# Patient Record
Sex: FEMALE | Race: OTHER | HISPANIC OR LATINO | Employment: FULL TIME | URBAN - METROPOLITAN AREA
[De-identification: names, ages, dates, MRNs, and addresses within clinical notes are randomized per-mention and may not be internally consistent; named-entity substitution may affect disease eponyms.]

---

## 2019-10-10 ENCOUNTER — OFFICE VISIT (OUTPATIENT)
Dept: URGENT CARE | Facility: CLINIC | Age: 34
End: 2019-10-10
Payer: COMMERCIAL

## 2019-10-10 VITALS
TEMPERATURE: 99.5 F | HEART RATE: 78 BPM | WEIGHT: 242 LBS | OXYGEN SATURATION: 98 % | HEIGHT: 64 IN | SYSTOLIC BLOOD PRESSURE: 120 MMHG | DIASTOLIC BLOOD PRESSURE: 82 MMHG | BODY MASS INDEX: 41.32 KG/M2 | RESPIRATION RATE: 18 BRPM

## 2019-10-10 DIAGNOSIS — J20.9 ACUTE BRONCHITIS, UNSPECIFIED ORGANISM: Primary | ICD-10-CM

## 2019-10-10 PROCEDURE — 99203 OFFICE O/P NEW LOW 30 MIN: CPT | Performed by: PHYSICIAN ASSISTANT

## 2019-10-10 RX ORDER — AZITHROMYCIN 250 MG/1
TABLET, FILM COATED ORAL
Qty: 6 TABLET | Refills: 0 | Status: SHIPPED | OUTPATIENT
Start: 2019-10-10 | End: 2019-10-14

## 2019-10-10 RX ORDER — ALBUTEROL SULFATE 90 UG/1
2 AEROSOL, METERED RESPIRATORY (INHALATION) EVERY 4 HOURS PRN
Qty: 18 G | Refills: 0 | Status: SHIPPED | OUTPATIENT
Start: 2019-10-10

## 2019-10-10 RX ORDER — ALBUTEROL SULFATE 90 UG/1
2 AEROSOL, METERED RESPIRATORY (INHALATION) EVERY 4 HOURS PRN
COMMUNITY
End: 2019-10-10 | Stop reason: SDUPTHER

## 2019-10-10 RX ORDER — CETIRIZINE HYDROCHLORIDE 10 MG/1
10 TABLET ORAL DAILY PRN
COMMUNITY

## 2019-10-10 RX ORDER — BENZONATATE 200 MG/1
200 CAPSULE ORAL 3 TIMES DAILY PRN
Qty: 20 CAPSULE | Refills: 0 | Status: SHIPPED | OUTPATIENT
Start: 2019-10-10 | End: 2022-06-09 | Stop reason: ALTCHOICE

## 2019-10-10 NOTE — PROGRESS NOTES
[facility]        NAME: Sharif Sanchez is a 28 y o  female  : 1985    MRN: [id number]  DATE: 2019  TIME: 6:43 AM    Assessment and Plan   Acute bronchitis, unspecified organism [J20 9]  1  Acute bronchitis, unspecified organism  azithromycin (ZITHROMAX) 250 mg tablet    benzonatate (TESSALON) 200 MG capsule    albuterol (PROVENTIL HFA,VENTOLIN HFA) 90 mcg/act inhaler         Patient Instructions     Discussed condition with pt  She has acute bronchitis which I will treat with combination of Zpack, Tessalon Perles, and refilled her rescue inhaler  Rec hydration, rest, discussed OTC cold meds including Mucinex DM, continuing her Zyrtec for allergies, and observation  Follow up with PCP in 3-5 days  Proceed to  ER if symptoms worsen  Chief Complaint     Chief Complaint   Patient presents with    Cold Like Symptoms     Allergy s/s x 1 month  Then started with harsh, congested cough with chest tightness, SCHMIDT and occ  wheeze when supine 2 days ago  Has chill, sore throat, HA with facila pian, nasal congestion and PND  Taking Zyrtec and [place] Cold and Sinus (none today)   Cough         History of Present Illness       Pt presents with what started one month ago as uncontrolled allergy symptoms including runny nose, sneezing, PND, congestion but now over the past two days, she has a cough, chest hurts from the cough, pain with taking deep breath, chest tightness, wheezing in the AM when supine  She still has nasal congestion, PND, ST, chills but no fever or N/V/D  She has taken Zyrtec for allergies and [place] Cold and Flu  Has asthma as well, uses rescue inhaler PRN but does not have one currently  She vapes daily  Review of Systems   Review of Systems   Constitutional: Positive for chills  Negative for fever  HENT: Positive for congestion, postnasal drip and sore throat  Respiratory: Positive for cough, chest tightness and wheezing   Negative for shortness of breath  Cardiovascular: Negative  Gastrointestinal: Negative  Genitourinary: Negative  Current Medications       Current Outpatient Medications:     albuterol (PROVENTIL HFA,VENTOLIN HFA) 90 mcg/act inhaler, Inhale 2 puffs every 4 (four) hours as needed for wheezing, Disp: 18 g, Rfl: 0    Calcium Carbonate (CALCIUM 600) 1500 (600 Ca) MG TABS, Take by mouth daily, Disp: , Rfl:     cetirizine (ZyrTEC) 10 mg tablet, Take 10 mg by mouth daily as needed for allergies, Disp: , Rfl:     levonorgestrel (MIRENA) 20 MCG/24HR IUD, 1 each by Intrauterine route once, Disp: , Rfl:     azithromycin (ZITHROMAX) 250 mg tablet, Take 2 tablets day 1 with food, then 1 tablet daily days 2-5 with food  , Disp: 6 tablet, Rfl: 0    benzonatate (TESSALON) 200 MG capsule, Take 1 capsule (200 mg total) by mouth 3 (three) times a day as needed for cough, Disp: 20 capsule, Rfl: 0    Current Allergies     Allergies as of 10/10/2019    (No Known Allergies)            The following portions of the patient's history were reviewed and updated as appropriate: allergies, current medications, past family history, past medical history, past social history, past surgical history and problem list      Past Medical History:   Diagnosis Date    Allergic rhinitis     Asthma     Urinary tract infection        Past Surgical History:   Procedure Laterality Date    NO PAST SURGERIES         History reviewed  No pertinent family history  Medications have been verified  Objective   /82 (BP Location: Left arm, Patient Position: Sitting, Cuff Size: Large)   Pulse 78   Temp 99 5 °F (37 5 °C) (Tympanic)   Resp 18   Ht 5' 4" (1 626 m)   Wt 110 kg (242 lb)   LMP 10/09/2019 Comment: Mirena  SpO2 98%   BMI 41 54 kg/m²        Physical Exam     Physical Exam   Constitutional: She is oriented to person, place, and time  She appears well-developed and well-nourished  No distress     HENT:   Right Ear: Hearing, tympanic membrane, external ear and ear canal normal    Left Ear: Hearing, tympanic membrane, external ear and ear canal normal    Nose: Mucosal edema (B/L boggy turbinates) present  Mouth/Throat: Mucous membranes are normal  Posterior oropharyngeal erythema (PND) present  No oropharyngeal exudate  Neck: Neck supple  Cardiovascular: Normal rate, regular rhythm and normal heart sounds  Pulmonary/Chest: Effort normal  She has no wheezes  She has rhonchi (B/L diffuse coarse breath sounds heard throughout)  Lymphadenopathy:     She has no cervical adenopathy  Neurological: She is alert and oriented to person, place, and time  Psychiatric: She has a normal mood and affect  Vitals reviewed

## 2019-10-10 NOTE — LETTER
October 10, 2019     Patient: Bossman Larsen   YOB: 1985   Date of Visit: 10/10/2019       To Whom it May Concern:    Bossman Larsen was seen in my clinic on 10/10/2019  She may return to work on 10/12/2019  If you have any questions or concerns, please don't hesitate to call           Sincerely,          Alexia Hills PA-C        CC: No Recipients

## 2019-10-10 NOTE — PATIENT INSTRUCTIONS
Acute Bronchitis   WHAT YOU NEED TO KNOW:   Acute bronchitis is swelling and irritation in the air passages of your lungs  This irritation may cause you to cough or have other breathing problems  Acute bronchitis often starts because of another illness, such as a cold or the flu  The illness spreads from your nose and throat to your windpipe and airways  Bronchitis is often called a chest cold  Acute bronchitis lasts about 3 to 6 weeks and is usually not a serious illness  Your cough can last for several weeks  DISCHARGE INSTRUCTIONS:   Return to the emergency department if:   · You cough up blood  · Your lips or fingernails turn blue  · You feel like you are not getting enough air when you breathe  Contact your healthcare provider if:   · You have a fever  · Your breathing problems do not go away or get worse  · Your cough does not get better within 4 weeks  · You have questions or concerns about your condition or care  Self-care:   · Get more rest   Rest helps your body to heal  Slowly start to do more each day  Rest when you feel it is needed  · Avoid irritants in the air  Avoid chemicals, fumes, and dust  Wear a face mask if you must work around dust or fumes  Stay inside on days when air pollution levels are high  If you have allergies, stay inside when pollen counts are high  Do not use aerosol products, such as spray-on deodorant, bug spray, and hair spray  · Do not smoke or be around others who smoke  Nicotine and other chemicals in cigarettes and cigars damages the cilia that move mucus out of your lungs  Ask your healthcare provider for information if you currently smoke and need help to quit  E-cigarettes or smokeless tobacco still contain nicotine  Talk to your healthcare provider before you use these products  · Drink liquids as directed  Liquids help keep your air passages moist and help you cough up mucus   You may need to drink more liquids when you have acute bronchitis  Ask how much liquid to drink each day and which liquids are best for you  · Use a humidifier or vaporizer  Use a cool mist humidifier or a vaporizer to increase air moisture in your home  This may make it easier for you to breathe and help decrease your cough  Decrease risk for acute bronchitis:   · Get the vaccinations you need  Ask your healthcare provider if you should get vaccinated against the flu or pneumonia  · Prevent the spread of germs  You can decrease your risk of acute bronchitis and other illnesses by doing the following:     [facility] your hands often with soap and water  Carry germ-killing hand lotion or gel with you  You can use the lotion or gel to clean your hands when soap and water are not available  ¨ Do not touch your eyes, nose, or mouth unless you have washed your hands first     ¨ Always cover your mouth when you cough to prevent the spread of germs  It is best to cough into a tissue or your shirt sleeve instead of into your hand  Ask those around you cover their mouths when they cough  ¨ Try to avoid people who have a cold or the flu  If you are sick, stay away from others as much as possible  Medicines: Your healthcare provider may  give you any of the following:  · Ibuprofen or acetaminophen  are medicines that help lower your fever  They are available without a doctor's order  Ask your healthcare provider which medicine is right for you  Ask how much to take and how often to take it  Follow directions  These medicines can cause stomach bleeding if not taken correctly  Ibuprofen can cause kidney damage  Do not take ibuprofen if you have kidney disease, an ulcer, or allergies to aspirin  Acetaminophen can cause liver damage  Do not take more than 4,000 milligrams in 24 hours  · Decongestants  help loosen mucus in your lungs and make it easier to cough up  This can help you breathe easier  · Cough suppressants  decrease your urge to cough   If your cough produces mucus, do not take a cough suppressant unless your healthcare provider tells you to  Your healthcare provider may suggest that you take a cough suppressant at night so you can rest     · Inhalers  may be given  Your healthcare provider may give you one or more inhalers to help you breathe easier and cough less  An inhaler gives your medicine to open your airways  Ask your healthcare provider to show you how to use your inhaler correctly  · Take your medicine as directed  Contact your healthcare provider if you think your medicine is not helping or if you have side effects  Tell him of her if you are allergic to any medicine  Keep a list of the medicines, vitamins, and herbs you take  Include the amounts, and when and why you take them  Bring the list or the pill bottles to follow-up visits  Carry your medicine list with you in case of an emergency  Follow up with your healthcare provider as directed:  Write down questions you have so you will remember to ask them during your follow-up visits  © 2017 [address] [employer] Information is for End User's use only and may not be sold, redistributed or otherwise used for commercial purposes  All illustrations and images included in CareNotes® are the copyrighted property of [employer]  or Michael [employer]  The above information is an  only  It is not intended as medical advice for individual conditions or treatments  Talk to your doctor, nurse or pharmacist before following any medical regimen to see if it is safe and effective for you

## 2020-01-04 ENCOUNTER — OFFICE VISIT (OUTPATIENT)
Dept: URGENT CARE | Facility: CLINIC | Age: 35
End: 2020-01-04
Payer: COMMERCIAL

## 2020-01-04 ENCOUNTER — CLINICAL SUPPORT (OUTPATIENT)
Dept: URGENT CARE | Facility: CLINIC | Age: 35
End: 2020-01-04
Payer: COMMERCIAL

## 2020-01-04 VITALS
TEMPERATURE: 97.9 F | HEART RATE: 69 BPM | SYSTOLIC BLOOD PRESSURE: 167 MMHG | RESPIRATION RATE: 18 BRPM | WEIGHT: 239 LBS | HEIGHT: 64 IN | DIASTOLIC BLOOD PRESSURE: 92 MMHG | OXYGEN SATURATION: 99 % | BODY MASS INDEX: 40.8 KG/M2

## 2020-01-04 VITALS — TEMPERATURE: 97.9 F

## 2020-01-04 DIAGNOSIS — R10.32 LEFT LOWER QUADRANT ABDOMINAL PAIN: Primary | ICD-10-CM

## 2020-01-04 DIAGNOSIS — Z23 ENCOUNTER FOR IMMUNIZATION: Primary | ICD-10-CM

## 2020-01-04 LAB
SL AMB  POCT GLUCOSE, UA: NORMAL
SL AMB LEUKOCYTE ESTERASE,UA: NORMAL
SL AMB POCT BILIRUBIN,UA: NORMAL
SL AMB POCT BLOOD,UA: NORMAL
SL AMB POCT CLARITY,UA: NORMAL
SL AMB POCT COLOR,UA: YELLOW
SL AMB POCT KETONES,UA: NORMAL
SL AMB POCT NITRITE,UA: NORMAL
SL AMB POCT PH,UA: 5
SL AMB POCT SPECIFIC GRAVITY,UA: 1.01
SL AMB POCT URINE PROTEIN: NORMAL
SL AMB POCT UROBILINOGEN: 0.2

## 2020-01-04 PROCEDURE — 90471 IMMUNIZATION ADMIN: CPT

## 2020-01-04 PROCEDURE — 81002 URINALYSIS NONAUTO W/O SCOPE: CPT | Performed by: PHYSICIAN ASSISTANT

## 2020-01-04 PROCEDURE — 99213 OFFICE O/P EST LOW 20 MIN: CPT | Performed by: PHYSICIAN ASSISTANT

## 2020-01-04 PROCEDURE — 87086 URINE CULTURE/COLONY COUNT: CPT | Performed by: PHYSICIAN ASSISTANT

## 2020-01-04 PROCEDURE — 90686 IIV4 VACC NO PRSV 0.5 ML IM: CPT

## 2020-01-04 NOTE — PROGRESS NOTES
[facility]        NAME: Pooja Villar is a 27 y o  female  : 1985    MRN: [id number]  DATE: 2020  TIME: 9:57 PM    Assessment and Plan   Left lower quadrant abdominal pain [R10 32]  1  Left lower quadrant abdominal pain  POCT urine dip    Urine culture         Patient Instructions   UA dip negative, will send out urine culture and call if abnormal  Possible ovarian cyst, rec f/u with GYN  If pain worsens rec going to ER, discussed warning signs of ovarian torsion  Follow up with PCP in 3-5 days  Proceed to  ER if symptoms worsen  Chief Complaint     Chief Complaint   Patient presents with    Possible UTI     UTI s/s started approx 1 week with LLQ pain, increased frequency and pain with coitus  History of Present Illness       Marylen Brinks is a 41-year-old female complaining of left lower quadrant pain x2 days  She states the pain is described as a dull ache however occasionally has sharp twinges  She also notes an increase in pain with intercourse  She denies any dysuria, frequent urination, urinary urgency, hematuria, fever, chills, nausea, vomiting, diarrhea, or constipation  She does note a history of ovarian cyst but has not had a pelvic ultrasound done recently  She has a IUD for contraception  Review of Systems   Review of Systems   Constitutional: Negative for chills and fever  Gastrointestinal: Positive for abdominal pain  Negative for constipation, diarrhea, nausea and vomiting  Genitourinary: Negative for dysuria, flank pain, frequency, hematuria, urgency, vaginal bleeding, vaginal discharge and vaginal pain       Current Medications       Current Outpatient Medications:     albuterol (PROVENTIL HFA,VENTOLIN HFA) 90 mcg/act inhaler, Inhale 2 puffs every 4 (four) hours as needed for wheezing, Disp: 18 g, Rfl: 0    benzonatate (TESSALON) 200 MG capsule, Take 1 capsule (200 mg total) by mouth 3 (three) times a day as needed for cough, Disp: 20 capsule, Rfl: 0    Calcium Carbonate (CALCIUM 600) 1500 (600 Ca) MG TABS, Take by mouth daily, Disp: , Rfl:     cetirizine (ZyrTEC) 10 mg tablet, Take 10 mg by mouth daily as needed for allergies, Disp: , Rfl:     levonorgestrel (MIRENA) 20 MCG/24HR IUD, 1 each by Intrauterine route once, Disp: , Rfl:     Current Allergies     Allergies as of 01/04/2020    (No Known Allergies)            The following portions of the patient's history were reviewed and updated as appropriate: allergies, current medications, past family history, past medical history, past social history, past surgical history and problem list      Past Medical History:   Diagnosis Date    Allergic rhinitis     Asthma     Urinary tract infection        Past Surgical History:   Procedure Laterality Date    NO PAST SURGERIES         History reviewed  No pertinent family history  Medications have been verified  Objective   /92   Pulse 69   Temp 97 9 °F (36 6 °C)   Resp 18   Ht 5' 4" (1 626 m)   Wt 108 kg (239 lb)   SpO2 99%   BMI 41 02 kg/m²        Physical Exam     Physical Exam   Constitutional: She is oriented to person, place, and time  She appears well-developed and well-nourished  No distress  Cardiovascular: Normal rate, regular rhythm and normal heart sounds  Pulmonary/Chest: Effort normal and breath sounds normal    Abdominal: Soft  Bowel sounds are normal  There is tenderness in the left lower quadrant  There is no rigidity, no rebound, no guarding and no CVA tenderness  Neurological: She is alert and oriented to person, place, and time  Psychiatric: She has a normal mood and affect  Nursing note and vitals reviewed

## 2020-01-04 NOTE — PATIENT INSTRUCTIONS
UA dip negative, will send out urine culture and call if abnormal  Possible ovarian cyst, rec f/u with GYN  If pain worsens rec going to ER, discussed warning signs of ovarian torsion  Follow up with PCP in 3-5 days  Proceed to  ER if symptoms worsen

## 2020-01-05 LAB — BACTERIA UR CULT: NORMAL

## 2021-04-12 DIAGNOSIS — Z23 ENCOUNTER FOR IMMUNIZATION: ICD-10-CM

## 2021-04-21 ENCOUNTER — IMMUNIZATIONS (OUTPATIENT)
Dept: FAMILY MEDICINE CLINIC | Facility: HOSPITAL | Age: 36
End: 2021-04-21

## 2021-04-21 DIAGNOSIS — Z23 ENCOUNTER FOR IMMUNIZATION: Primary | ICD-10-CM

## 2021-05-12 ENCOUNTER — IMMUNIZATIONS (OUTPATIENT)
Dept: FAMILY MEDICINE CLINIC | Facility: HOSPITAL | Age: 36
End: 2021-05-12

## 2021-05-12 DIAGNOSIS — Z23 ENCOUNTER FOR IMMUNIZATION: Primary | ICD-10-CM

## 2022-06-09 ENCOUNTER — OFFICE VISIT (OUTPATIENT)
Dept: FAMILY MEDICINE CLINIC | Facility: CLINIC | Age: 37
End: 2022-06-09
Payer: COMMERCIAL

## 2022-06-09 VITALS
WEIGHT: 255 LBS | TEMPERATURE: 97.4 F | SYSTOLIC BLOOD PRESSURE: 124 MMHG | DIASTOLIC BLOOD PRESSURE: 72 MMHG | OXYGEN SATURATION: 99 % | RESPIRATION RATE: 18 BRPM | HEART RATE: 84 BPM | HEIGHT: 64 IN | BODY MASS INDEX: 43.54 KG/M2

## 2022-06-09 DIAGNOSIS — Z00.00 WELL ADULT EXAM: Primary | ICD-10-CM

## 2022-06-09 DIAGNOSIS — Z13.6 SCREENING FOR CARDIOVASCULAR, RESPIRATORY, AND GENITOURINARY DISEASES: ICD-10-CM

## 2022-06-09 DIAGNOSIS — Z13.89 SCREENING FOR CARDIOVASCULAR, RESPIRATORY, AND GENITOURINARY DISEASES: ICD-10-CM

## 2022-06-09 DIAGNOSIS — Z11.59 NEED FOR HEPATITIS C SCREENING TEST: ICD-10-CM

## 2022-06-09 DIAGNOSIS — Z11.4 SCREENING FOR HIV (HUMAN IMMUNODEFICIENCY VIRUS): ICD-10-CM

## 2022-06-09 DIAGNOSIS — Z13.29 SCREENING FOR THYROID DISORDER: ICD-10-CM

## 2022-06-09 DIAGNOSIS — Z13.83 SCREENING FOR CARDIOVASCULAR, RESPIRATORY, AND GENITOURINARY DISEASES: ICD-10-CM

## 2022-06-09 PROCEDURE — 99385 PREV VISIT NEW AGE 18-39: CPT | Performed by: FAMILY MEDICINE

## 2022-06-09 PROCEDURE — 3725F SCREEN DEPRESSION PERFORMED: CPT | Performed by: FAMILY MEDICINE

## 2022-06-09 NOTE — PROGRESS NOTES
FAMILY PRACTICE HEALTH MAINTENANCE OFFICE VISIT  [facility]    NAME: Shiloh Horner  AGE: 39 y o  SEX: female  : 1985     DATE: 2022    Assessment and Plan     1  Well adult exam    2  Need for hepatitis C screening test  -     Hepatitis C Antibody ([facility]); Future    3  Screening for HIV (human immunodeficiency virus)  -     [facility]- Human Immunodeficiency Virus 1/2 Antigen / Antibody ( Fourth Generation) with Reflex Testing; Future    4  Screening for cardiovascular, respiratory, and genitourinary diseases  -     CBC and differential; Future  -     Comprehensive metabolic panel; Future  -     Lipid Panel with Direct LDL reflex; Future    5  Screening for thyroid disorder  -     TSH, 3rd generation with Free T4 reflex; Future      Patient Counseling:   Nutrition: Stressed importance of a well balanced diet, moderation of sodium/saturated fat, caloric balance and sufficient intake of fiber  Exercise: Stressed the importance of regular exercise with a goal of 150 minutes per week  Dental Health: Discussed daily flossing and brushing and regular dental visits   Immunizations reviewed: Up To Date  Discussed benefits of:  Screening labs  BMI Counseling: Body mass index is 43 77 kg/m²  Discussed with patient's BMI with her  The BMI is above normal  Nutrition recommendations include reducing portion sizes, decreasing overall calorie intake, 3-5 servings of fruits/vegetables daily, reducing fast food intake, consuming healthier snacks, decreasing soda and/or juice intake and moderation in carbohydrate intake  Exercise recommendations include moderate aerobic physical activity for 150 minutes/week  No follow-ups on file          Chief Complaint     Chief Complaint   Patient presents with    Physical Exam     [id number]       History of Present Illness     HPI    Well Adult Physical   Patient here for a comprehensive physical exam       Diet and Physical Activity  Diet: poor diet  Exercise: frequently      Depression Screen  PHQ-2/9 Depression Screening    Little interest or pleasure in doing things: 0 - not at all  Feeling down, depressed, or hopeless: 0 - not at all  PHQ-2 Score: 0  PHQ-2 Interpretation: Negative depression screen          General Health  Hearing: Normal:  bilateral  Vision: no vision problems, goes for regular eye exams and wears glasses  Dental: regular dental visits, brushes teeth twice daily and does not floss    Reproductive Health  No issues  and Follows with gynecologist      The following portions of the patient's history were reviewed and updated as appropriate: allergies, current medications, past family history, past medical history, past social history, past surgical history and problem list     Review of Systems     Review of Systems   Constitutional: Negative  HENT: Negative  Eyes: Negative  Respiratory: Negative  Cardiovascular: Negative  Gastrointestinal: Negative  Endocrine: Negative  Genitourinary: Negative  Musculoskeletal: Negative  Skin: Negative  Allergic/Immunologic: Negative  Neurological: Negative  Hematological: Negative  Psychiatric/Behavioral: Negative          Past Medical History     Past Medical History:   Diagnosis Date    Allergic rhinitis     Asthma     Urinary tract infection        Past Surgical History     Past Surgical History:   Procedure Laterality Date    NO PAST SURGERIES         Social History     Social History     Socioeconomic History    Marital status: Single     Spouse name: None    Number of children: None    Years of education: None    Highest education level: None   Occupational History    None   Tobacco Use    Smoking status: Former Smoker     Types: Cigarettes     Quit date: 2018     Years since quittin 0    Smokeless tobacco: Never Used   Vaping Use    Vaping Use: Never used   Substance and Sexual Activity  Alcohol use: Yes     Comment: 3 wine coolers - every other week    Drug use: Yes     Types: Marijuana    Sexual activity: None   Other Topics Concern    None   Social History Narrative    None     Social Determinants of Health     Financial Resource Strain: Not on file   Food Insecurity: Not on file   Transportation Needs: Not on file   Physical Activity: Not on file   Stress: Not on file   Social Connections: Not on file   Intimate Partner Violence: Not on file   Housing Stability: Not on file       Family History     History reviewed  No pertinent family history  Current Medications       Current Outpatient Medications:     albuterol (PROVENTIL HFA,VENTOLIN HFA) 90 mcg/act inhaler, Inhale 2 puffs every 4 (four) hours as needed for wheezing, Disp: 18 g, Rfl: 0    cetirizine (ZyrTEC) 10 mg tablet, Take 10 mg by mouth daily as needed for allergies, Disp: , Rfl:     levonorgestrel (MIRENA) 20 MCG/24HR IUD, 1 each by Intrauterine route once, Disp: , Rfl:      Allergies     No Known Allergies    Objective     /72   Pulse 84   Temp (!) 97 4 °F (36 3 °C)   Resp 18   Ht 5' 4" (1 626 m)   Wt 116 kg (255 lb)   SpO2 99%   BMI 43 77 kg/m²      Physical Exam  Constitutional:       General: She is not in acute distress  Appearance: Normal appearance  She is well-developed  She is not diaphoretic  HENT:      Head: Normocephalic and atraumatic  Right Ear: Tympanic membrane, ear canal and external ear normal  There is no impacted cerumen  Left Ear: Tympanic membrane, ear canal and external ear normal  There is no impacted cerumen  Eyes:      General: No scleral icterus  Right eye: No discharge  Left eye: No discharge  Extraocular Movements: Extraocular movements intact  Conjunctiva/sclera: Conjunctivae normal       Pupils: Pupils are equal, round, and reactive to light  Cardiovascular:      Rate and Rhythm: Normal rate and regular rhythm        Heart sounds: Normal heart sounds  No murmur heard  No friction rub  No gallop  Pulmonary:      Effort: Pulmonary effort is normal  No respiratory distress  Breath sounds: Normal breath sounds  No wheezing or rales  Chest:      Chest wall: No tenderness  Abdominal:      General: Bowel sounds are normal  There is no distension  Palpations: Abdomen is soft  There is no mass  Tenderness: There is no abdominal tenderness  There is no guarding or rebound  Musculoskeletal:         General: No deformity  Normal range of motion  Cervical back: Normal range of motion and neck supple  Skin:     General: Skin is warm and dry  Findings: No erythema or rash  Neurological:      Mental Status: She is alert and oriented to person, place, and time  Psychiatric:         Behavior: Behavior normal          Thought Content:  Thought content normal          Judgment: Judgment normal             Visual Acuity Screening    Right eye Left eye Both eyes   Without correction:      With correction: 20/15 20/20 20/13           MD EVGENY Holliday [facility]

## 2022-06-15 ENCOUNTER — OFFICE VISIT (OUTPATIENT)
Dept: FAMILY MEDICINE CLINIC | Facility: CLINIC | Age: 37
End: 2022-06-15
Payer: COMMERCIAL

## 2022-06-15 VITALS
BODY MASS INDEX: 43.02 KG/M2 | HEIGHT: 64 IN | OXYGEN SATURATION: 98 % | HEART RATE: 79 BPM | TEMPERATURE: 97.9 F | DIASTOLIC BLOOD PRESSURE: 80 MMHG | SYSTOLIC BLOOD PRESSURE: 130 MMHG | WEIGHT: 252 LBS | RESPIRATION RATE: 16 BRPM

## 2022-06-15 DIAGNOSIS — Z03.818 ENCOUNTER FOR OBSERVATION FOR SUSPECTED EXPOSURE TO OTHER BIOLOGICAL AGENTS RULED OUT: ICD-10-CM

## 2022-06-15 DIAGNOSIS — Z13.21 ENCOUNTER FOR VITAMIN DEFICIENCY SCREENING: ICD-10-CM

## 2022-06-15 DIAGNOSIS — F41.9 ANXIETY: Primary | ICD-10-CM

## 2022-06-15 PROCEDURE — 3008F BODY MASS INDEX DOCD: CPT | Performed by: FAMILY MEDICINE

## 2022-06-15 PROCEDURE — 99213 OFFICE O/P EST LOW 20 MIN: CPT | Performed by: FAMILY MEDICINE

## 2022-06-15 PROCEDURE — 1036F TOBACCO NON-USER: CPT | Performed by: FAMILY MEDICINE

## 2022-06-15 NOTE — PROGRESS NOTES
Assessment/Plan:       Diagnoses and all orders for this visit:    Anxiety  -     Ambulatory Referral to [facility]; Future    Encounter for observation for suspected exposure to other biological agents ruled out  -     SARS CoV-2 ANTIBODY,IgG- Lab Collect; Future    Encounter for vitamin deficiency screening  -     Vitamin B12; Future  -     Folate; Future  -     Vitamin D 25 hydroxy; Future        Subjective:      Patient ID: Flower Khan is a 39 y o  female  HPI   Deisi Elise presents today to discuss blood work and anxiety  She would like testing for vitamin def including folic acid, X-85 and vitamin D  She also would like covid-19 antibody testing  She has been struggling with anxiety on/off for years  Would like to see a therapist      The following portions of the patient's history were reviewed and updated as appropriate: allergies, current medications, past family history, past medical history, past social history, past surgical history and problem list     Review of Systems   Constitutional: Negative  HENT: Negative  Eyes: Negative  Respiratory: Negative  Cardiovascular: Negative  Gastrointestinal: Negative  Endocrine: Negative  Genitourinary: Negative  Musculoskeletal: Negative  Skin: Negative  Allergic/Immunologic: Negative  Neurological: Negative  Hematological: Negative  Psychiatric/Behavioral: Negative  Objective:      /80   Pulse 79   Temp 97 9 °F (36 6 °C)   Resp 16   Ht 5' 4" (1 626 m)   Wt 114 kg (252 lb)   SpO2 98%   BMI 43 26 kg/m²          Physical Exam  Constitutional:       General: She is not in acute distress  Appearance: She is well-developed  She is not diaphoretic  HENT:      Head: Normocephalic and atraumatic  Eyes:      General: No scleral icterus  Right eye: No discharge  Left eye: No discharge        Conjunctiva/sclera: Conjunctivae normal    Pulmonary:      Effort: Pulmonary effort is normal    Musculoskeletal:      Cervical back: Normal range of motion  Skin:     General: Skin is warm  Neurological:      Mental Status: She is alert and oriented to person, place, and time  Psychiatric:         Behavior: Behavior normal          Thought Content:  Thought content normal          Judgment: Judgment normal

## 2022-06-17 LAB
25(OH)D3+25(OH)D2 SERPL-MCNC: 38 NG/ML (ref 30–100)
ALBUMIN SERPL-MCNC: 4.3 G/DL (ref 3.8–4.8)
ALBUMIN/GLOB SERPL: 2 {RATIO} (ref 1.2–2.2)
ALP SERPL-CCNC: 61 IU/L (ref 44–121)
ALT SERPL-CCNC: 14 IU/L (ref 0–32)
AST SERPL-CCNC: 16 IU/L (ref 0–40)
BASOPHILS # BLD AUTO: 0.1 X10E3/UL (ref 0–0.2)
BASOPHILS NFR BLD AUTO: 1 %
BILIRUB SERPL-MCNC: 0.2 MG/DL (ref 0–1.2)
BUN SERPL-MCNC: 14 MG/DL (ref 6–20)
BUN/CREAT SERPL: 19 (ref 9–23)
CALCIUM SERPL-MCNC: 9.3 MG/DL (ref 8.7–10.2)
CHLORIDE SERPL-SCNC: 102 MMOL/L (ref 96–106)
CHOLEST SERPL-MCNC: 203 MG/DL (ref 100–199)
CO2 SERPL-SCNC: 23 MMOL/L (ref 20–29)
CREAT SERPL-MCNC: 0.72 MG/DL (ref 0.57–1)
EGFR: 110 ML/MIN/1.73
EOSINOPHIL # BLD AUTO: 0.2 X10E3/UL (ref 0–0.4)
EOSINOPHIL NFR BLD AUTO: 2 %
ERYTHROCYTE [DISTWIDTH] IN BLOOD BY AUTOMATED COUNT: 13.2 % (ref 11.7–15.4)
FOLATE SERPL-MCNC: 15.6 NG/ML
GLOBULIN SER-MCNC: 2.1 G/DL (ref 1.5–4.5)
GLUCOSE SERPL-MCNC: 89 MG/DL (ref 65–99)
HCT VFR BLD AUTO: 36.1 % (ref 34–46.6)
HCV AB S/CO SERPL IA: <0.1 S/CO RATIO (ref 0–0.9)
HDLC SERPL-MCNC: 65 MG/DL
HGB BLD-MCNC: 11.9 G/DL (ref 11.1–15.9)
HIV 1+2 AB+HIV1 P24 AG SERPL QL IA: NON REACTIVE
IMM GRANULOCYTES # BLD: 0 X10E3/UL (ref 0–0.1)
IMM GRANULOCYTES NFR BLD: 0 %
LDLC SERPL CALC-MCNC: 116 MG/DL (ref 0–99)
LYMPHOCYTES # BLD AUTO: 2.3 X10E3/UL (ref 0.7–3.1)
LYMPHOCYTES NFR BLD AUTO: 27 %
MCH RBC QN AUTO: 27.9 PG (ref 26.6–33)
MCHC RBC AUTO-ENTMCNC: 33 G/DL (ref 31.5–35.7)
MCV RBC AUTO: 85 FL (ref 79–97)
MICRODELETION SYND BLD/T FISH: NORMAL
MONOCYTES # BLD AUTO: 0.6 X10E3/UL (ref 0.1–0.9)
MONOCYTES NFR BLD AUTO: 7 %
NEUTROPHILS # BLD AUTO: 5.7 X10E3/UL (ref 1.4–7)
NEUTROPHILS NFR BLD AUTO: 63 %
PLATELET # BLD AUTO: 296 X10E3/UL (ref 150–450)
POTASSIUM SERPL-SCNC: 4.3 MMOL/L (ref 3.5–5.2)
PROT SERPL-MCNC: 6.4 G/DL (ref 6–8.5)
RBC # BLD AUTO: 4.27 X10E6/UL (ref 3.77–5.28)
SARS-COV-2 AB SERPL QL IA: NORMAL U/ML
SARS-COV-2 AB SERPL-IMP: POSITIVE
SARS-COV-2 SPIKE AB DILUTION: 3568 U/ML
SODIUM SERPL-SCNC: 138 MMOL/L (ref 134–144)
TRIGL SERPL-MCNC: 124 MG/DL (ref 0–149)
TSH SERPL DL<=0.005 MIU/L-ACNC: 1.49 UIU/ML (ref 0.45–4.5)
VIT B12 SERPL-MCNC: 395 PG/ML (ref 232–1245)
WBC # BLD AUTO: 8.8 X10E3/UL (ref 3.4–10.8)

## 2023-01-11 ENCOUNTER — OFFICE VISIT (OUTPATIENT)
Dept: OBGYN CLINIC | Facility: CLINIC | Age: 38
End: 2023-01-11

## 2023-01-11 VITALS
DIASTOLIC BLOOD PRESSURE: 80 MMHG | BODY MASS INDEX: 42.1 KG/M2 | HEIGHT: 64 IN | WEIGHT: 246.6 LBS | SYSTOLIC BLOOD PRESSURE: 130 MMHG

## 2023-01-11 DIAGNOSIS — Z01.419 WELL WOMAN EXAM WITH ROUTINE GYNECOLOGICAL EXAM: Primary | ICD-10-CM

## 2023-01-11 DIAGNOSIS — Z11.51 SCREENING FOR HPV (HUMAN PAPILLOMAVIRUS): ICD-10-CM

## 2023-01-11 DIAGNOSIS — Z23 NEED FOR HPV VACCINATION: ICD-10-CM

## 2023-01-11 DIAGNOSIS — Z11.3 SCREENING FOR STD (SEXUALLY TRANSMITTED DISEASE): ICD-10-CM

## 2023-01-11 DIAGNOSIS — Z12.4 ENCOUNTER FOR SCREENING FOR MALIGNANT NEOPLASM OF CERVIX: ICD-10-CM

## 2023-01-11 RX ORDER — ACYCLOVIR 400 MG/1
TABLET ORAL
COMMUNITY
Start: 2022-10-24 | End: 2023-01-11

## 2023-01-11 RX ORDER — VALACYCLOVIR HYDROCHLORIDE 500 MG/1
500 TABLET, FILM COATED ORAL DAILY
COMMUNITY
Start: 2022-12-14

## 2023-01-11 NOTE — PROGRESS NOTES
ASSESSMENT & PLAN:   Diagnoses and all orders for this visit:    Well woman exam with routine gynecological exam  -     Liquid-based pap, screening    Encounter for screening for malignant neoplasm of cervix  -     Liquid-based pap, screening    Screening for HPV (human papillomavirus)  -     Liquid-based pap, screening    Screening for STD (sexually transmitted disease)  -     Chlamydia/GC amplified DNA by PCR    Need for HPV vaccination  -     HPV Vaccine 9 valent IM    Other orders  -     valACYclovir (VALTREX) 500 mg tablet; Take 500 mg by mouth daily  -     acyclovir (ZOVIRAX) 400 MG tablet; TAKE 1 TABLET BY MOUTH THREE TIMES A DAY FOR 5 DAYS      The following were reviewed in today's visit: ASCCP guidelines, Gardisil vaccination, STD testing breast self exam, STD testing, exercise and healthy diet  Patient to return to office in yearly for annual exam      All questions have been answered to her satisfaction  CC:  Annual Gynecologic Examination  Chief Complaint   Patient presents with   • Gynecologic Exam     Pt is here for a annual  Pap indicated, last pap 1/22/18- Result not available  HPI: Rikki Mckeon is a 44 y o  No obstetric history on file  who presents for annual gynecologic examination  She has the following concerns:  None today  Health Maintenance:    Exercise: frequently  Breast exams/breast awareness: yes  Diet: mostly well balanced      Past Medical History:   Diagnosis Date   • Allergic rhinitis    • Asthma    • HPV (human papilloma virus) infection    • Urinary tract infection        Past Surgical History:   Procedure Laterality Date   • NO PAST SURGERIES         Past OB/Gyn History:  Period Cycle (Days): 28  Period Duration (Days): 7  Period Pattern: Regular  Menstrual Flow: Heavy  Menstrual Control: Maxi pad, Thin pad, Panty linerNo LMP recorded (lmp unknown)  Patient has had an implant      Last Pap: 2018 per patient : no abnormalities  History of abnormal Pap smear: yes - PAP 6 months ago with HPV per patient  HPV vaccine completed: no, would like first vaccine today    Patient is currently sexually active  STD testing: yes  Current contraception: IUD      Family History  Family History   Problem Relation Age of Onset   • Asthma Maternal Grandmother    • Diabetes Paternal Grandmother    • Diabetes Paternal Grandfather        Family history of uterine or ovarian cancer: yes - cousin  Family history of breast cancer: no  Family history of colon cancer: no    Social History:  Social History     Socioeconomic History   • Marital status: Single     Spouse name: Not on file   • Number of children: Not on file   • Years of education: Not on file   • Highest education level: Not on file   Occupational History   • Not on file   Tobacco Use   • Smoking status: Former     Types: Cigarettes     Quit date: 2018     Years since quittin 6   • Smokeless tobacco: Never   Vaping Use   • Vaping Use: Never used   Substance and Sexual Activity   • Alcohol use: Yes     Comment: 3 wine coolers - every other week   • Drug use: Yes     Types: Marijuana     Comment: Medical Card    • Sexual activity: Yes     Partners: Male     Birth control/protection: I U D     Other Topics Concern   • Not on file   Social History Narrative   • Not on file     Social Determinants of Health     Financial Resource Strain: Not on file   Food Insecurity: Not on file   Transportation Needs: Not on file   Physical Activity: Not on file   Stress: Not on file   Social Connections: Not on file   Intimate Partner Violence: Not on file   Housing Stability: Not on file     Domestic violence screen: negative    Allergies:  No Known Allergies    Medications:    Current Outpatient Medications:   •  acyclovir (ZOVIRAX) 400 MG tablet, TAKE 1 TABLET BY MOUTH THREE TIMES A DAY FOR 5 DAYS, Disp: , Rfl:   •  albuterol (PROVENTIL HFA,VENTOLIN HFA) 90 mcg/act inhaler, Inhale 2 puffs every 4 (four) hours as needed for wheezing, Disp: 18 g, Rfl: 0  •  cetirizine (ZyrTEC) 10 mg tablet, Take 10 mg by mouth daily as needed for allergies, Disp: , Rfl:   •  levonorgestrel (MIRENA) 20 MCG/24HR IUD, 1 each by Intrauterine route once, Disp: , Rfl:   •  valACYclovir (VALTREX) 500 mg tablet, Take 500 mg by mouth daily, Disp: , Rfl:     Review of Systems:  Review of Systems   Constitutional: Negative for chills, fever and unexpected weight change  Respiratory: Negative for shortness of breath  Cardiovascular: Negative for chest pain  Gastrointestinal: Negative for abdominal pain, diarrhea, nausea and vomiting  Skin: Negative for rash  Psychiatric/Behavioral: Negative for dysphoric mood  The patient is not nervous/anxious  Physical Exam:  /80 (BP Location: Right arm, Patient Position: Sitting, Cuff Size: Standard)   Ht 5' 4" (1 626 m)   Wt 112 kg (246 lb 9 6 oz)   LMP  (LMP Unknown) Comment: IUD Mirena 2020  BMI 42 33 kg/m²    Physical Exam  Constitutional:       General: She is not in acute distress  Appearance: Normal appearance  She is obese  She is not ill-appearing, toxic-appearing or diaphoretic  Genitourinary:      Vulva and urethral meatus normal       No lesions in the vagina  Right Labia: No rash or lesions  Left Labia: No lesions or rash  No vaginal discharge, erythema or bleeding  Right Adnexa: not tender and no mass present  Left Adnexa: not tender and no mass present  No cervical discharge or lesion  IUD strings visualized  Uterus is not tender  Breasts:     Breast exam comments: Breast exam deferred  [facility] HENT:      Head: Normocephalic and atraumatic  Cardiovascular:      Rate and Rhythm: Normal rate and regular rhythm  Heart sounds: Normal heart sounds  No murmur heard  No friction rub  No gallop  Pulmonary:      Effort: Pulmonary effort is normal       Breath sounds: Normal breath sounds  No wheezing, rhonchi or rales     Abdominal: General: Abdomen is flat  There is no distension  Palpations: Abdomen is soft  Tenderness: There is no abdominal tenderness  Musculoskeletal:      Cervical back: Neck supple  Neurological:      General: No focal deficit present  Mental Status: She is alert  Skin:     General: Skin is warm and dry  Psychiatric:         Mood and Affect: Mood normal          Behavior: Behavior normal    Vitals reviewed

## 2023-01-12 LAB
HPV HR 12 DNA CVX QL NAA+PROBE: NEGATIVE
HPV16 DNA CVX QL NAA+PROBE: NEGATIVE
HPV18 DNA CVX QL NAA+PROBE: NEGATIVE

## 2023-01-13 LAB
C TRACH DNA SPEC QL NAA+PROBE: NEGATIVE
N GONORRHOEA DNA SPEC QL NAA+PROBE: NEGATIVE

## 2023-01-19 LAB
LAB AP GYN PRIMARY INTERPRETATION: NORMAL
Lab: NORMAL

## 2023-02-20 ENCOUNTER — TELEPHONE (OUTPATIENT)
Dept: PSYCHIATRY | Facility: CLINIC | Age: 38
End: 2023-02-20

## 2023-02-20 NOTE — TELEPHONE ENCOUNTER
Called patient regarding referral to be placed on proper wait list  LVM for patient to call intake dept ([phone]) back

## 2023-02-23 ENCOUNTER — TELEPHONE (OUTPATIENT)
Dept: PSYCHIATRY | Facility: CLINIC | Age: 38
End: 2023-02-23

## 2023-03-10 ENCOUNTER — CLINICAL SUPPORT (OUTPATIENT)
Dept: OBGYN CLINIC | Facility: CLINIC | Age: 38
End: 2023-03-10

## 2023-03-10 DIAGNOSIS — B00.9 HSV INFECTION: Primary | ICD-10-CM

## 2023-03-10 DIAGNOSIS — Z23 NEED FOR HPV VACCINATION: Primary | ICD-10-CM

## 2023-03-10 RX ORDER — VALACYCLOVIR HYDROCHLORIDE 500 MG/1
500 TABLET, FILM COATED ORAL DAILY
Qty: 90 TABLET | Refills: 1 | Status: SHIPPED | OUTPATIENT
Start: 2023-03-10 | End: 2023-06-08

## 2023-07-14 ENCOUNTER — CLINICAL SUPPORT (OUTPATIENT)
Dept: OBGYN CLINIC | Facility: CLINIC | Age: 38
End: 2023-07-14
Payer: COMMERCIAL

## 2023-07-14 ENCOUNTER — OFFICE VISIT (OUTPATIENT)
Dept: FAMILY MEDICINE CLINIC | Facility: CLINIC | Age: 38
End: 2023-07-14
Payer: COMMERCIAL

## 2023-07-14 VITALS
TEMPERATURE: 98.4 F | DIASTOLIC BLOOD PRESSURE: 84 MMHG | HEIGHT: 64 IN | BODY MASS INDEX: 41.48 KG/M2 | WEIGHT: 243 LBS | HEART RATE: 70 BPM | SYSTOLIC BLOOD PRESSURE: 134 MMHG | RESPIRATION RATE: 16 BRPM

## 2023-07-14 DIAGNOSIS — Z13.89 SCREENING FOR CARDIOVASCULAR, RESPIRATORY, AND GENITOURINARY DISEASES: ICD-10-CM

## 2023-07-14 DIAGNOSIS — Z13.6 SCREENING FOR CARDIOVASCULAR, RESPIRATORY, AND GENITOURINARY DISEASES: ICD-10-CM

## 2023-07-14 DIAGNOSIS — Z23 NEED FOR HPV VACCINATION: Primary | ICD-10-CM

## 2023-07-14 DIAGNOSIS — Z13.83 SCREENING FOR CARDIOVASCULAR, RESPIRATORY, AND GENITOURINARY DISEASES: ICD-10-CM

## 2023-07-14 DIAGNOSIS — Z13.29 SCREENING FOR THYROID DISORDER: ICD-10-CM

## 2023-07-14 DIAGNOSIS — Z00.00 WELL ADULT EXAM: Primary | ICD-10-CM

## 2023-07-14 DIAGNOSIS — Z23 NEED FOR VACCINATION: ICD-10-CM

## 2023-07-14 PROCEDURE — 90715 TDAP VACCINE 7 YRS/> IM: CPT

## 2023-07-14 PROCEDURE — 90471 IMMUNIZATION ADMIN: CPT

## 2023-07-14 PROCEDURE — 90651 9VHPV VACCINE 2/3 DOSE IM: CPT

## 2023-07-14 PROCEDURE — 99395 PREV VISIT EST AGE 18-39: CPT | Performed by: FAMILY MEDICINE

## 2023-07-14 NOTE — PROGRESS NOTES
FAMILY PRACTICE HEALTH MAINTENANCE OFFICE VISIT  [facility]    NAME: Ernie Page  AGE: 44 y.o. SEX: female  : 1985     DATE: 2023    Assessment and Plan     1. Well adult exam    2. Screening for thyroid disorder  -     TSH, 3rd generation with Free T4 reflex; Future    3. Screening for cardiovascular, respiratory, and genitourinary diseases  -     CBC and differential; Future  -     Comprehensive metabolic panel; Future  -     Lipid Panel with Direct LDL reflex; Future    4. Need for vaccination  -     TDAP VACCINE GREATER THAN OR EQUAL TO 6YO IM        Patient Counseling:   Nutrition: Stressed importance of a well balanced diet, moderation of sodium/saturated fat, caloric balance and sufficient intake of fiber  Exercise: Stressed the importance of regular exercise with a goal of 150 minutes per week  Dental Health: Discussed daily flossing and brushing and regular dental visits   Immunizations reviewed: See Orders  Discussed benefits of:  Screening labs. BMI Counseling: Body mass index is 41.71 kg/m². Discussed with patient's BMI with her. The BMI is above normal. Nutrition recommendations include reducing portion sizes, decreasing overall calorie intake, 3-5 servings of fruits/vegetables daily, reducing fast food intake, consuming healthier snacks and decreasing soda and/or juice intake. Exercise recommendations include moderate aerobic physical activity for 150 minutes/week. No follow-ups on file.         Chief Complaint     Chief Complaint   Patient presents with   • Physical Exam     Sas/cma       History of Present Illness     HPI    Well Adult Physical   Patient here for a comprehensive physical exam.      Diet and Physical Activity  Diet: well balanced diet  Exercise: daily      Depression Screen  PHQ-2/9 Depression Screening    Little interest or pleasure in doing things: 0 - not at all  Feeling down, depressed, or hopeless: 0 - not at all  PHQ-2 Score: 0  PHQ-2 Interpretation: Negative depression screen          General Health  Hearing: Normal:  bilateral  Vision: no vision problems, goes for regular eye exams and wears glasses  Dental: regular dental visits, brushes teeth twice daily and flosses teeth occasionally    Reproductive Health  No issues  and Follows with gynecologist      The following portions of the patient's history were reviewed and updated as appropriate: allergies, current medications, past family history, past medical history, past social history, past surgical history and problem list.    Review of Systems     Review of Systems   Constitutional: Negative. HENT: Negative. Eyes: Negative. Respiratory: Negative. Cardiovascular: Negative. Gastrointestinal: Negative. Endocrine: Negative. Genitourinary: Negative. Musculoskeletal: Negative. Skin: Negative. Allergic/Immunologic: Negative. Neurological: Negative. Hematological: Negative. Psychiatric/Behavioral: Negative.         Past Medical History     Past Medical History:   Diagnosis Date   • Allergic rhinitis    • Asthma    • HPV (human papilloma virus) infection    • Urinary tract infection        Past Surgical History     Past Surgical History:   Procedure Laterality Date   • NO PAST SURGERIES         Social History     Social History     Socioeconomic History   • Marital status: Single     Spouse name: None   • Number of children: None   • Years of education: None   • Highest education level: None   Occupational History   • None   Tobacco Use   • Smoking status: Former     Packs/day: 0.25     Years: 16.00     Total pack years: 4.00     Types: Cigarettes     Start date: 6/15/2002     Quit date: 2018     Years since quittin.1   • Smokeless tobacco: Never   Vaping Use   • Vaping Use: Never used   Substance and Sexual Activity   • Alcohol use: Yes     Comment: 3 wine coolers - every other week   • Drug use: Yes     Types: Marijuana     Comment: Medical Card 2021   • Sexual activity: Yes     Partners: Male     Birth control/protection: I.U.D. Other Topics Concern   • None   Social History Narrative   • None     Social Determinants of Health     Financial Resource Strain: Not on file   Food Insecurity: Not on file   Transportation Needs: Not on file   Physical Activity: Not on file   Stress: Not on file   Social Connections: Not on file   Intimate Partner Violence: Not on file   Housing Stability: Not on file       Family History     Family History   Problem Relation Age of Onset   • Asthma Maternal Grandmother    • Diabetes Paternal Grandmother    • Diabetes Paternal Grandfather        Current Medications       Current Outpatient Medications:   •  albuterol (PROVENTIL HFA,VENTOLIN HFA) 90 mcg/act inhaler, Inhale 2 puffs every 4 (four) hours as needed for wheezing, Disp: 18 g, Rfl: 0  •  cetirizine (ZyrTEC) 10 mg tablet, Take 10 mg by mouth daily as needed for allergies, Disp: , Rfl:   •  levonorgestrel (MIRENA) 20 MCG/24HR IUD, 1 each by Intrauterine route once, Disp: , Rfl:   •  valACYclovir (VALTREX) 500 mg tablet, Take 1 tablet (500 mg total) by mouth daily, Disp: 90 tablet, Rfl: 1     Allergies     No Known Allergies    Objective     /84   Pulse 70   Temp 98.4 °F (36.9 °C)   Resp 16   Ht 5' 4" (1.626 m)   Wt 110 kg (243 lb)   LMP 07/10/2023 (Exact Date)   BMI 41.71 kg/m²      Physical Exam  Constitutional:       General: She is not in acute distress. Appearance: Normal appearance. She is well-developed. She is not diaphoretic. HENT:      Head: Normocephalic and atraumatic. Right Ear: Tympanic membrane, ear canal and external ear normal. There is no impacted cerumen. Left Ear: Tympanic membrane, ear canal and external ear normal. There is no impacted cerumen. Eyes:      General: No scleral icterus. Right eye: No discharge. Left eye: No discharge. Extraocular Movements: Extraocular movements intact. Conjunctiva/sclera: Conjunctivae normal.      Pupils: Pupils are equal, round, and reactive to light. Cardiovascular:      Rate and Rhythm: Normal rate and regular rhythm. Heart sounds: Normal heart sounds. No murmur heard. No friction rub. No gallop. Pulmonary:      Effort: Pulmonary effort is normal. No respiratory distress. Breath sounds: Normal breath sounds. No wheezing or rales. Chest:      Chest wall: No tenderness. Abdominal:      General: Bowel sounds are normal. There is no distension. Palpations: Abdomen is soft. There is no mass. Tenderness: There is no abdominal tenderness. There is no guarding or rebound. Musculoskeletal:         General: No deformity. Normal range of motion. Cervical back: Normal range of motion and neck supple. Skin:     General: Skin is warm and dry. Findings: No erythema or rash. Neurological:      Mental Status: She is alert and oriented to person, place, and time. Psychiatric:         Behavior: Behavior normal.         Thought Content:  Thought content normal.         Judgment: Judgment normal.           Vision Screening    Right eye Left eye Both eyes   Without correction      With correction 20/20 20/25 20/20           MD EVGENY Kilpatrick [facility]

## 2023-07-17 ENCOUNTER — OFFICE VISIT (OUTPATIENT)
Dept: FAMILY MEDICINE CLINIC | Facility: CLINIC | Age: 38
End: 2023-07-17
Payer: COMMERCIAL

## 2023-07-17 VITALS
RESPIRATION RATE: 16 BRPM | HEART RATE: 90 BPM | DIASTOLIC BLOOD PRESSURE: 90 MMHG | TEMPERATURE: 98 F | SYSTOLIC BLOOD PRESSURE: 120 MMHG | WEIGHT: 243 LBS | BODY MASS INDEX: 41.71 KG/M2

## 2023-07-17 DIAGNOSIS — R10.9 ABDOMINAL CRAMPING: ICD-10-CM

## 2023-07-17 DIAGNOSIS — K92.1 BLACK STOOLS: ICD-10-CM

## 2023-07-17 DIAGNOSIS — N93.9 ABNORMAL VAGINAL BLEEDING: Primary | ICD-10-CM

## 2023-07-17 DIAGNOSIS — R30.0 DYSURIA: ICD-10-CM

## 2023-07-17 LAB
SL AMB  POCT GLUCOSE, UA: NORMAL
SL AMB LEUKOCYTE ESTERASE,UA: NORMAL
SL AMB POCT BILIRUBIN,UA: NORMAL
SL AMB POCT BLOOD,UA: NORMAL
SL AMB POCT CLARITY,UA: CLEAR
SL AMB POCT COLOR,UA: NORMAL
SL AMB POCT KETONES,UA: NORMAL
SL AMB POCT NITRITE,UA: NORMAL
SL AMB POCT PH,UA: 6.5
SL AMB POCT SPECIFIC GRAVITY,UA: 1.01
SL AMB POCT URINE PROTEIN: NORMAL
SL AMB POCT UROBILINOGEN: 0.2

## 2023-07-17 PROCEDURE — 81003 URINALYSIS AUTO W/O SCOPE: CPT | Performed by: NURSE PRACTITIONER

## 2023-07-17 PROCEDURE — 99214 OFFICE O/P EST MOD 30 MIN: CPT | Performed by: NURSE PRACTITIONER

## 2023-07-17 PROCEDURE — 87086 URINE CULTURE/COLONY COUNT: CPT | Performed by: NURSE PRACTITIONER

## 2023-07-17 NOTE — PROGRESS NOTES
Assessment/Plan:    1. Abnormal vaginal bleeding  -     US pelvis complete w transvaginal; Future; Expected date: 07/17/2023    2. Dysuria  -     POCT urine dip auto non-scope  -     Urine culture    3. Black stools  -     Ambulatory Referral to Gastroenterology; Future    4. Abdominal cramping          Patient Instructions:  Supportive care discussed and advised. Advised to RTO for any worsening and no improvement. Follow up for no improvement and worsening of conditions. Patient advised and educated when to see immediate medical care. Return if symptoms worsen or fail to improve. No future appointments. Subjective:      Patient ID: Rogers Tidwell is a 44 y.o. female. Chief Complaint   Patient presents with   • Generalized Body Aches   • Headache     Sas/cma         Vitals:  /90   Pulse 90   Temp 98 °F (36.7 °C)   Resp 16   Wt 110 kg (243 lb)   LMP 07/10/2023 (Exact Date)   BMI 41.71 kg/m²     HPI  Patient stated that has IUD and gets occasional vaginal spotting when she is stressed. Stated that this time couple of days ago started with vaginal bleeding with some abdominal cramping and mostly on lower abdomen area. Stated that also having some discomfort with urination, headache and bodyaches. Stated that one day her stool was black too. The following portions of the patient's history were reviewed and updated as appropriate: allergies, current medications, past family history, past medical history, past social history, past surgical history and problem list.      Review of Systems   Constitutional: Negative for chills, diaphoresis, fatigue, fever and unexpected weight change. Respiratory: Negative. Cardiovascular: Negative. Gastrointestinal: Positive for abdominal pain and nausea. Negative for constipation, diarrhea and vomiting. Genitourinary: Positive for dysuria and vaginal bleeding.  Negative for decreased urine volume, difficulty urinating, flank pain, frequency, genital sores, hematuria and urgency. Musculoskeletal: Positive for arthralgias. Negative for myalgias. Skin: Negative. Neurological: Positive for headaches. Negative for dizziness. Objective:    Social History     Tobacco Use   Smoking Status Former   • Packs/day: 0.25   • Years: 16.00   • Total pack years: 4.00   • Types: Cigarettes   • Start date: 6/15/2002   • Quit date: 2018   • Years since quittin.1   Smokeless Tobacco Never       Allergies: No Known Allergies      Current Outpatient Medications   Medication Sig Dispense Refill   • albuterol (PROVENTIL HFA,VENTOLIN HFA) 90 mcg/act inhaler Inhale 2 puffs every 4 (four) hours as needed for wheezing 18 g 0   • cetirizine (ZyrTEC) 10 mg tablet Take 10 mg by mouth daily as needed for allergies     • levonorgestrel (MIRENA) 20 MCG/24HR IUD 1 each by Intrauterine route once     • valACYclovir (VALTREX) 500 mg tablet Take 1 tablet (500 mg total) by mouth daily 90 tablet 1     No current facility-administered medications for this visit. Physical Exam  Vitals reviewed. Constitutional:       Appearance: She is well-developed. Cardiovascular:      Rate and Rhythm: Normal rate and regular rhythm. Heart sounds: Normal heart sounds. Pulmonary:      Effort: Pulmonary effort is normal.      Breath sounds: Normal breath sounds. Abdominal:      General: Bowel sounds are normal.      Palpations: Abdomen is soft. Tenderness: There is abdominal tenderness in the suprapubic area. Skin:     General: Skin is warm and dry. Neurological:      Mental Status: She is alert and oriented to person, place, and time. Psychiatric:         Behavior: Behavior normal.         Thought Content:  Thought content normal.         Judgment: Judgment normal.               Recent Results (from the past 24 hour(s))   POCT urine dip auto non-scope    Collection Time: 23  7:22 PM   Result Value Ref Range     COLOR,UA light yellow CLARITY,UA clear     SPECIFIC GRAVITY,UA 1.015      PH,UA 6.5     LEUKOCYTE ESTERASE,UA neg     NITRITE,UA neg     GLUCOSE, UA neg     KETONES,UA neg     BILIRUBIN,UA neg     BLOOD,UA neg     POCT URINE PROTEIN neg     SL AMB POCT UROBILINOGEN 0.2            AMISHA Laughlin

## 2023-07-19 ENCOUNTER — HOSPITAL ENCOUNTER (OUTPATIENT)
Dept: RADIOLOGY | Facility: HOSPITAL | Age: 38
Discharge: HOME | End: 2023-07-19
Payer: COMMERCIAL

## 2023-07-19 DIAGNOSIS — N93.9 ABNORMAL VAGINAL BLEEDING: ICD-10-CM

## 2023-07-19 LAB — BACTERIA UR CULT: NORMAL

## 2023-07-19 PROCEDURE — 76830 TRANSVAGINAL US NON-OB: CPT

## 2023-07-19 PROCEDURE — 76856 US EXAM PELVIC COMPLETE: CPT

## 2023-07-20 LAB
ALBUMIN SERPL-MCNC: 4 G/DL (ref 3.9–4.9)
ALBUMIN/GLOB SERPL: 1.7 {RATIO} (ref 1.2–2.2)
ALP SERPL-CCNC: 60 IU/L (ref 44–121)
ALT SERPL-CCNC: 11 IU/L (ref 0–32)
AST SERPL-CCNC: 13 IU/L (ref 0–40)
BASOPHILS # BLD AUTO: 0.1 X10E3/UL (ref 0–0.2)
BASOPHILS NFR BLD AUTO: 1 %
BILIRUB SERPL-MCNC: <0.2 MG/DL (ref 0–1.2)
BUN SERPL-MCNC: 14 MG/DL (ref 6–20)
BUN/CREAT SERPL: 17 (ref 9–23)
CALCIUM SERPL-MCNC: 8.9 MG/DL (ref 8.7–10.2)
CHLORIDE SERPL-SCNC: 104 MMOL/L (ref 96–106)
CHOLEST SERPL-MCNC: 185 MG/DL (ref 100–199)
CO2 SERPL-SCNC: 24 MMOL/L (ref 20–29)
CREAT SERPL-MCNC: 0.84 MG/DL (ref 0.57–1)
EGFR: 91 ML/MIN/1.73
EOSINOPHIL # BLD AUTO: 0.3 X10E3/UL (ref 0–0.4)
EOSINOPHIL NFR BLD AUTO: 4 %
ERYTHROCYTE [DISTWIDTH] IN BLOOD BY AUTOMATED COUNT: 13 % (ref 11.7–15.4)
GLOBULIN SER-MCNC: 2.3 G/DL (ref 1.5–4.5)
GLUCOSE SERPL-MCNC: 106 MG/DL (ref 70–99)
HCT VFR BLD AUTO: 35.8 % (ref 34–46.6)
HDLC SERPL-MCNC: 69 MG/DL
HGB BLD-MCNC: 11.9 G/DL (ref 11.1–15.9)
IMM GRANULOCYTES # BLD: 0 X10E3/UL (ref 0–0.1)
IMM GRANULOCYTES NFR BLD: 0 %
LDLC SERPL CALC-MCNC: 103 MG/DL (ref 0–99)
LYMPHOCYTES # BLD AUTO: 1.9 X10E3/UL (ref 0.7–3.1)
LYMPHOCYTES NFR BLD AUTO: 32 %
MCH RBC QN AUTO: 29.3 PG (ref 26.6–33)
MCHC RBC AUTO-ENTMCNC: 33.2 G/DL (ref 31.5–35.7)
MCV RBC AUTO: 88 FL (ref 79–97)
MICRODELETION SYND BLD/T FISH: NORMAL
MONOCYTES # BLD AUTO: 0.4 X10E3/UL (ref 0.1–0.9)
MONOCYTES NFR BLD AUTO: 7 %
NEUTROPHILS # BLD AUTO: 3.4 X10E3/UL (ref 1.4–7)
NEUTROPHILS NFR BLD AUTO: 56 %
PLATELET # BLD AUTO: 235 X10E3/UL (ref 150–450)
POTASSIUM SERPL-SCNC: 4.6 MMOL/L (ref 3.5–5.2)
PROT SERPL-MCNC: 6.3 G/DL (ref 6–8.5)
RBC # BLD AUTO: 4.06 X10E6/UL (ref 3.77–5.28)
SODIUM SERPL-SCNC: 140 MMOL/L (ref 134–144)
TRIGL SERPL-MCNC: 70 MG/DL (ref 0–149)
TSH SERPL DL<=0.005 MIU/L-ACNC: 1 UIU/ML (ref 0.45–4.5)
WBC # BLD AUTO: 6 X10E3/UL (ref 3.4–10.8)

## 2023-08-14 ENCOUNTER — TELEPHONE (OUTPATIENT)
Dept: PSYCHIATRY | Facility: CLINIC | Age: 38
End: 2023-08-14

## 2023-08-14 NOTE — TELEPHONE ENCOUNTER
LVM to R/S for NP appointment or move to her following appt 09/13/23 due to Catina not in office..

## 2023-08-29 ENCOUNTER — TELEPHONE (OUTPATIENT)
Dept: PSYCHIATRY | Facility: CLINIC | Age: 38
End: 2023-08-29

## 2023-08-29 NOTE — TELEPHONE ENCOUNTER
Patient's new appointment now moved to every other Thursday at 2:00 pm with Sarah Morgan. Patient confirmed this appointment time for 2:00 pm every other Thursday and all appointments are scheduled in computer. Time change was there is meeting for 3:00 pm Thursday.

## 2023-09-19 DIAGNOSIS — B00.9 HSV INFECTION: ICD-10-CM

## 2023-09-19 RX ORDER — VALACYCLOVIR HYDROCHLORIDE 500 MG/1
500 TABLET, FILM COATED ORAL DAILY
Qty: 90 TABLET | Refills: 1 | Status: SHIPPED | OUTPATIENT
Start: 2023-09-19 | End: 2023-12-18

## 2023-09-21 ENCOUNTER — TELEPHONE (OUTPATIENT)
Dept: PSYCHIATRY | Facility: CLINIC | Age: 38
End: 2023-09-21

## 2023-09-21 NOTE — TELEPHONE ENCOUNTER
Patient is calling regarding cancelling an appointment.     Date/Time: 9/21/2023    Reason: Patient stuck at work and can't get off    Patient was rescheduled: YES [x] NO []  If yes, when was Patient reschedule for: 9/28/2023    Patient requesting call back to reschedule: YES [] NO [x]

## 2023-09-25 ENCOUNTER — OFFICE VISIT (OUTPATIENT)
Dept: GASTROENTEROLOGY | Facility: CLINIC | Age: 38
End: 2023-09-25
Payer: COMMERCIAL

## 2023-09-25 VITALS
HEIGHT: 64 IN | HEART RATE: 84 BPM | DIASTOLIC BLOOD PRESSURE: 80 MMHG | SYSTOLIC BLOOD PRESSURE: 137 MMHG | WEIGHT: 253.2 LBS | BODY MASS INDEX: 43.23 KG/M2

## 2023-09-25 DIAGNOSIS — R10.9 ABDOMINAL CRAMPING: ICD-10-CM

## 2023-09-25 DIAGNOSIS — K92.1 BLACK STOOLS: ICD-10-CM

## 2023-09-25 DIAGNOSIS — K58.1 IRRITABLE BOWEL SYNDROME WITH CONSTIPATION: Primary | ICD-10-CM

## 2023-09-25 DIAGNOSIS — K21.9 GASTROESOPHAGEAL REFLUX DISEASE, UNSPECIFIED WHETHER ESOPHAGITIS PRESENT: ICD-10-CM

## 2023-09-25 DIAGNOSIS — R19.4 CHANGE IN BOWEL HABITS: ICD-10-CM

## 2023-09-25 DIAGNOSIS — K59.00 CONSTIPATION, UNSPECIFIED CONSTIPATION TYPE: ICD-10-CM

## 2023-09-25 PROCEDURE — 99204 OFFICE O/P NEW MOD 45 MIN: CPT | Performed by: PHYSICIAN ASSISTANT

## 2023-09-25 RX ORDER — LINACLOTIDE 72 UG/1
72 CAPSULE, GELATIN COATED ORAL DAILY
Qty: 30 CAPSULE | Refills: 3 | Status: SHIPPED | OUTPATIENT
Start: 2023-09-25

## 2023-09-25 NOTE — PROGRESS NOTES
[facility] - Outpatient Consultation  Mireya Chen 44 y.o. female MRN: [id number]  Encounter: [id number]          ASSESSMENT AND PLAN:      #1. Change in bowel habits with constipation, abdominal cramping, and bloating: over the last 2-3 months. Some improvement in bowels with metamucil but cramping remains. Did see her gynecologist as well to ensure it was not gynecological related issue.   -suspect functional type symptoms, would continue metamucil  -will trial low dose linzess for bloating/cramping/incomplete stools. If patient does not have benefit with this or has any worsening, will plan colonoscopy at next visit. Discussed this procedure in detail with patient.  -discussed medication in detail and possible SE    #2. Dark stools: happened 2-3 times a few months ago, hgb at that time was normal.  -advised to call us with any recurrence     #3. Acid reflux: has had this off and on for years but does not take meds and is controlling this well with diet at this time  -can consider EGD at same time as colonoscopy if this is pursued to rule out chronic inflammation from reflux  ______________________________________________________________________    HPI: This is a 59-year-old female with no significant past medical history who presents for new patient visit due to some change in bowel habits with abdominal cramping and bloating that started a few months ago. Patient reports prior to this she was having regular bowel movements without any issues. She reports that over the last 2 to 3 months she was having incomplete evacuation and very small stools. She had some improvement in this with taking Metamucil which she takes about 5 pills 3 times a day but it is not completely resolved. She also continues to have a lower ache with intermittent abdominal cramping in the lower abdomen that tends to be worse after eating. Denies any blood in the stool.   She reports that she had 2-3 episodes of dark stools a few months ago but this has not recurred and had a normal hemoglobin check at that time. She reports having acid reflux off and on for years but is managing this well with diet at this time with minimal symptoms. Denies any significant dysphagia symptoms. Had been on omeprazole for 30 days once about a year ago. She takes Advil 2-3 times a week. Denies any unexplained weight loss. Denies any significant family history of colon cancer, colitis, or any other significant GI issues. She works at [employer]. She has never had an EGD or colonoscopy in the past.      REVIEW OF SYSTEMS:    CONSTITUTIONAL: Denies any fever, chills, rigors, and weight loss. HEENT: No earache or tinnitus. Denies hearing loss or visual disturbances. CARDIOVASCULAR: No chest pain or palpitations. RESPIRATORY: Denies any cough, hemoptysis, shortness of breath or dyspnea on exertion. GASTROINTESTINAL: As noted in the History of Present Illness. GENITOURINARY: No problems with urination. Denies any hematuria or dysuria. NEUROLOGIC: No dizziness or vertigo, denies headaches. MUSCULOSKELETAL: Denies any muscle or joint pain. SKIN: Denies skin rashes or itching. ENDOCRINE: Denies excessive thirst. Denies intolerance to heat or cold. PSYCHOSOCIAL: Denies depression or anxiety. Denies any recent memory loss.        Historical Information   Past Medical History:   Diagnosis Date   • Allergic rhinitis    • Asthma    • HPV (human papilloma virus) infection    • Urinary tract infection      Past Surgical History:   Procedure Laterality Date   • NO PAST SURGERIES       Social History   Social History     Substance and Sexual Activity   Alcohol Use Yes    Comment: special occasions     Social History     Substance and Sexual Activity   Drug Use Yes   • Types: Marijuana    Comment: Medical Card 2021     Social History     Tobacco Use   Smoking Status Former   • Packs/day: 0.25   • Years: 16.00   • Total pack years: 4.00   • Types: Cigarettes   • Start date: 6/15/2002   • Quit date: 2018   • Years since quittin.3   Smokeless Tobacco Never     Family History   Problem Relation Age of Onset   • Asthma Maternal Grandmother    • Diabetes Paternal Grandmother    • Diabetes Paternal Grandfather        Meds/Allergies       Current Outpatient Medications:   •  albuterol (PROVENTIL HFA,VENTOLIN HFA) 90 mcg/act inhaler  •  cetirizine (ZyrTEC) 10 mg tablet  •  levonorgestrel (MIRENA) 20 MCG/24HR IUD  •  linaCLOtide (Linzess) 72 MCG CAPS  •  psyllium (METAMUCIL) 58.6 % packet  •  valACYclovir (VALTREX) 500 mg tablet    No Known Allergies        Objective     Blood pressure 137/80, pulse 84, height 5' 4" (1.626 m), weight 115 kg (253 lb 3.2 oz). Body mass index is 43.46 kg/m². PHYSICAL EXAM:      General Appearance:   Alert, cooperative, no distress   HEENT:   Normocephalic, atraumatic, anicteric.     Neck:  Supple, symmetrical, trachea midline   Lungs:   Clear to auscultation bilaterally; no rales, rhonchi or wheezing; respirations unlabored    Heart[de-identified]   Regular rate and rhythm; no murmur, rub, or gallop. Abdomen:   Soft, lower abdominal discomfort bilaterally, non-distended; normal bowel sounds; no masses, no organomegaly    Genitalia:   Deferred    Rectal:   Deferred    Extremities:  No cyanosis, clubbing or edema    Pulses:  2+ and symmetric    Skin:  No jaundice, rashes, or lesions    Lymph nodes:  No palpable cervical lymphadenopathy        Lab Results:   No visits with results within 1 Day(s) from this visit.    Latest known visit with results is:   Orders Only on 2023   Component Date Value   • White Blood Cell Count 2023 6.0    • Red Blood Cell Count 2023 4.06    • Hemoglobin 2023 11.9    • HCT 2023 35.8    • MCV 2023 88    • MCH 2023 29.3    • MCHC 2023 33.2    • RDW 2023 13.0    • Platelet Count  235    • Neutrophils 2023 56    • Lymphocytes 07/19/2023 32    • Monocytes 07/19/2023 7    • Eosinophils 07/19/2023 4    • Basophils PCT 07/19/2023 1    • Neutrophils (Absolute) 07/19/2023 3.4    • Lymphocytes (Absolute) 07/19/2023 1.9    • Monocytes (Absolute) 07/19/2023 0.4    • Eosinophils (Absolute) 07/19/2023 0.3    • Basophils ABS 07/19/2023 0.1    • Immature Granulocytes 07/19/2023 0    • Immature Granulocytes (A* 07/19/2023 0.0    • Glucose, Random 07/19/2023 106 (H)    • BUN 07/19/2023 14    • Creatinine 07/19/2023 0.84    • eGFR 07/19/2023 91    • SL AMB BUN/CREATININE RA* 07/19/2023 17    • Sodium 07/19/2023 140    • Potassium 07/19/2023 4.6    • Chloride 07/19/2023 104    • CO2 07/19/2023 24    • CALCIUM 07/19/2023 8.9    • Protein, Total 07/19/2023 6.3    • Albumin 07/19/2023 4.0    • Globulin, Total 07/19/2023 2.3    • Albumin/Globulin Ratio 07/19/2023 1.7    • TOTAL BILIRUBIN 07/19/2023 <0.2    • Alk Phos Isoenzymes 07/19/2023 60    • AST 07/19/2023 13    • ALT 07/19/2023 11    • Cholesterol, Total 07/19/2023 185    • Triglycerides 07/19/2023 70    • HDL 07/19/2023 69    • LDL Calculated 07/19/2023 103 (H)    • TSH 07/19/2023 1.000    • Interpretation 07/19/2023 Note          Radiology Results:   No results found.

## 2023-09-25 NOTE — LETTER
September 25, 2023     Santy Smith MD  [address] [address]    Patient: Sj Davidson   YOB: 1985   Date of Visit: 9/25/2023       Dear Dr. Gabbi Cisneros:    Thank you for referring Sj Davidson to me for evaluation. Below are my notes for this consultation. If you have questions, please do not hesitate to call me. I look forward to following your patient along with you. Sincerely,        Laurence Bautista PA-C        CC: AMISHA Canada  9/25/2023  3:53 PM  Sign when Signing Visit  [facility] - Outpatient Consultation  Sj Davidson 44 y.o. female MRN: [id number]  Encounter: [id number]          ASSESSMENT AND PLAN:      #1. Change in bowel habits with constipation, abdominal cramping, and bloating: over the last 2-3 months. Some improvement in bowels with metamucil but cramping remains. Did see her gynecologist as well to ensure it was not gynecological related issue.   -suspect functional type symptoms, would continue metamucil  -will trial low dose linzess for bloating/cramping/incomplete stools. If patient does not have benefit with this or has any worsening, will plan colonoscopy at next visit. Discussed this procedure in detail with patient.  -discussed medication in detail and possible SE    #2. Dark stools: happened 2-3 times a few months ago, hgb at that time was normal.  -advised to call us with any recurrence     #3.  Acid reflux: has had this off and on for years but does not take meds and is controlling this well with diet at this time  -can consider EGD at same time as colonoscopy if this is pursued to rule out chronic inflammation from reflux  ______________________________________________________________________    HPI: This is a 65-year-old female with no significant past medical history who presents for new patient visit due to some change in bowel habits with abdominal cramping and bloating that started a few months ago. Patient reports prior to this she was having regular bowel movements without any issues. She reports that over the last 2 to 3 months she was having incomplete evacuation and very small stools. She had some improvement in this with taking Metamucil which she takes about 5 pills 3 times a day but it is not completely resolved. She also continues to have a lower ache with intermittent abdominal cramping in the lower abdomen that tends to be worse after eating. Denies any blood in the stool. She reports that she had 2-3 episodes of dark stools a few months ago but this has not recurred and had a normal hemoglobin check at that time. She reports having acid reflux off and on for years but is managing this well with diet at this time with minimal symptoms. Denies any significant dysphagia symptoms. Had been on omeprazole for 30 days once about a year ago. She takes Advil 2-3 times a week. Denies any unexplained weight loss. Denies any significant family history of colon cancer, colitis, or any other significant GI issues. She works at [employer]. She has never had an EGD or colonoscopy in the past.      REVIEW OF SYSTEMS:    CONSTITUTIONAL: Denies any fever, chills, rigors, and weight loss. HEENT: No earache or tinnitus. Denies hearing loss or visual disturbances. CARDIOVASCULAR: No chest pain or palpitations. RESPIRATORY: Denies any cough, hemoptysis, shortness of breath or dyspnea on exertion. GASTROINTESTINAL: As noted in the History of Present Illness. GENITOURINARY: No problems with urination. Denies any hematuria or dysuria. NEUROLOGIC: No dizziness or vertigo, denies headaches. MUSCULOSKELETAL: Denies any muscle or joint pain. SKIN: Denies skin rashes or itching. ENDOCRINE: Denies excessive thirst. Denies intolerance to heat or cold. PSYCHOSOCIAL: Denies depression or anxiety. Denies any recent memory loss.        Historical Information   Past Medical History:   Diagnosis Date   • Allergic rhinitis    • Asthma    • HPV (human papilloma virus) infection    • Urinary tract infection      Past Surgical History:   Procedure Laterality Date   • NO PAST SURGERIES       Social History   Social History     Substance and Sexual Activity   Alcohol Use Yes    Comment: special occasions     Social History     Substance and Sexual Activity   Drug Use Yes   • Types: Marijuana    Comment: Medical Card      Social History     Tobacco Use   Smoking Status Former   • Packs/day: 0.25   • Years: 16.00   • Total pack years: 4.00   • Types: Cigarettes   • Start date: 6/15/2002   • Quit date: 2018   • Years since quittin.3   Smokeless Tobacco Never     Family History   Problem Relation Age of Onset   • Asthma Maternal Grandmother    • Diabetes Paternal Grandmother    • Diabetes Paternal Grandfather        Meds/Allergies       Current Outpatient Medications:   •  albuterol (PROVENTIL HFA,VENTOLIN HFA) 90 mcg/act inhaler  •  cetirizine (ZyrTEC) 10 mg tablet  •  levonorgestrel (MIRENA) 20 MCG/24HR IUD  •  linaCLOtide (Linzess) 72 MCG CAPS  •  psyllium (METAMUCIL) 58.6 % packet  •  valACYclovir (VALTREX) 500 mg tablet    No Known Allergies        Objective     Blood pressure 137/80, pulse 84, height 5' 4" (1.626 m), weight 115 kg (253 lb 3.2 oz). Body mass index is 43.46 kg/m². PHYSICAL EXAM:      General Appearance:   Alert, cooperative, no distress   HEENT:   Normocephalic, atraumatic, anicteric. Neck:  Supple, symmetrical, trachea midline   Lungs:   Clear to auscultation bilaterally; no rales, rhonchi or wheezing; respirations unlabored    Heart[de-identified]   Regular rate and rhythm; no murmur, rub, or gallop.    Abdomen:   Soft, lower abdominal discomfort bilaterally, non-distended; normal bowel sounds; no masses, no organomegaly    Genitalia:   Deferred    Rectal:   Deferred    Extremities:  No cyanosis, clubbing or edema    Pulses:  2+ and symmetric    Skin:  No jaundice, rashes, or lesions    Lymph nodes:  No palpable cervical lymphadenopathy        Lab Results:   No visits with results within 1 Day(s) from this visit. Latest known visit with results is:   Orders Only on 07/19/2023   Component Date Value   • White Blood Cell Count 07/19/2023 6.0    • Red Blood Cell Count 07/19/2023 4.06    • Hemoglobin 07/19/2023 11.9    • HCT 07/19/2023 35.8    • MCV 07/19/2023 88    • MCH 07/19/2023 29.3    • MCHC 07/19/2023 33.2    • RDW 07/19/2023 13.0    • Platelet Count 10/25/1902 235    • Neutrophils 07/19/2023 56    • Lymphocytes 07/19/2023 32    • Monocytes 07/19/2023 7    • Eosinophils 07/19/2023 4    • Basophils PCT 07/19/2023 1    • Neutrophils (Absolute) 07/19/2023 3.4    • Lymphocytes (Absolute) 07/19/2023 1.9    • Monocytes (Absolute) 07/19/2023 0.4    • Eosinophils (Absolute) 07/19/2023 0.3    • Basophils ABS 07/19/2023 0.1    • Immature Granulocytes 07/19/2023 0    • Immature Granulocytes (A* 07/19/2023 0.0    • Glucose, Random 07/19/2023 106 (H)    • BUN 07/19/2023 14    • Creatinine 07/19/2023 0.84    • eGFR 07/19/2023 91    • SL AMB BUN/CREATININE RA* 07/19/2023 17    • Sodium 07/19/2023 140    • Potassium 07/19/2023 4.6    • Chloride 07/19/2023 104    • CO2 07/19/2023 24    • CALCIUM 07/19/2023 8.9    • Protein, Total 07/19/2023 6.3    • Albumin 07/19/2023 4.0    • Globulin, Total 07/19/2023 2.3    • Albumin/Globulin Ratio 07/19/2023 1.7    • TOTAL BILIRUBIN 07/19/2023 <0.2    • Alk Phos Isoenzymes 07/19/2023 60    • AST 07/19/2023 13    • ALT 07/19/2023 11    • Cholesterol, Total 07/19/2023 185    • Triglycerides 07/19/2023 70    • HDL 07/19/2023 69    • LDL Calculated 07/19/2023 103 (H)    • TSH 07/19/2023 1.000    • Interpretation 07/19/2023 Note          Radiology Results:   No results found.

## 2023-11-16 ENCOUNTER — OFFICE VISIT (OUTPATIENT)
Dept: URGENT CARE | Facility: CLINIC | Age: 38
End: 2023-11-16
Payer: COMMERCIAL

## 2023-11-16 VITALS
HEIGHT: 64 IN | RESPIRATION RATE: 20 BRPM | OXYGEN SATURATION: 99 % | TEMPERATURE: 99 F | BODY MASS INDEX: 42.34 KG/M2 | WEIGHT: 248 LBS | HEART RATE: 88 BPM

## 2023-11-16 DIAGNOSIS — B34.9 VIRAL INFECTION: Primary | ICD-10-CM

## 2023-11-16 LAB
SARS-COV-2 AG UPPER RESP QL IA: NEGATIVE
VALID CONTROL: NORMAL

## 2023-11-16 PROCEDURE — 87636 SARSCOV2 & INF A&B AMP PRB: CPT | Performed by: PHYSICIAN ASSISTANT

## 2023-11-16 PROCEDURE — 99203 OFFICE O/P NEW LOW 30 MIN: CPT | Performed by: PHYSICIAN ASSISTANT

## 2023-11-16 PROCEDURE — 87811 SARS-COV-2 COVID19 W/OPTIC: CPT | Performed by: PHYSICIAN ASSISTANT

## 2023-11-16 NOTE — PROGRESS NOTES
[facility]        NAME: Juma Mccormack is a 44 y.o. female  : 1985    MRN: [id number]  DATE: 2023  TIME: 11:19 AM    Assessment and Plan   Viral infection [B34.9]  1. Viral infection  Poct Covid 19 Rapid Antigen Test    Covid19 and INFLUENZA A/B PCR        Offered strep test but pt declined. Discussed supportive care and staying hydrated. Discussed strict return to care precautions as well as red flag symptoms which should prompt immediate ED referral. Pt verbalized understanding and is in agreement with plan. Please follow up with your primary care provider within the next week. Please remember that your visit today was with an urgent care provider and should not replace follow up with your primary care provider for chronic medical issues or annual physicals. ([facility])  IF YOU TEST POSITIVE FOR COVID-19:  If you have symptoms, you can end isolation after 5 full days if you are fever-free for 24 hours without the use of fever-reducing medication and your other symptoms have improved. Loss of taste and/or smell may persist for weeks or more and should not delay the end of isolation. Your first day of symptoms is DAY ZERO. You should continue to wear a well-fitting mask (like N95, [id number]) both at home and in public for 5 additional days. Avoid people who are at high risk for severe disease for at least 10 days. DO NOT go to places where you are unable to wear a mask until a full 10 days from your first symptom. If you have no symptoms, quarantine for 5 days from the day you were tested. The day you were tested is DAY ZERO. Continue wearing a mask around others until day 10. If you develop symptoms, your 5-day isolation period starts over. If you have/had severe symptoms and/or a compromised immune system, you may need to isolate longer. Consult with your primary care provider about when you can resume being around other people.     IF YOU HAVE HAD CLOSE EXPOSURE:  QUARANTINE IF: You are ages 25 or older and either have not been vaccinated, or have been vaccinated with your primary series but not the booster. You must quarantine for at least 5 days after your last contact. If you develop symptoms, get tested immediately. If you do not develop symptoms, get tested at least 5 days after your last exposure. Avoid people who are immunocompromised at high risk for severe disease until at least after 10 days. YOU DO NOT HAVE TO QUARANTINE IF:   You are 18 years or older and have received all recommended vaccine doses, including boosters and additional primary shots for some immunocompromised people. You are ages 6-13 and completed the primary series of COVID-19 vaccines. You had confirmed COVID-19 within the last 90 days on a viral test.  You should still wear a well-fitting mask around others for 10 days from the date of your last close exposure to COVID-19, and get tested at least 5 days later. Quarantine and isolation guidelines differ for healthcare professionals. Patient Instructions       Follow up with PCP in 3-5 days. Proceed to  ER if symptoms worsen. Chief Complaint     Chief Complaint   Patient presents with    Fever     Fever 102, body aches, chills, headache began yesterday. History of Present Illness       Joe Reece is a(n) 44 y.o. female presenting with URI symptoms x 1 days  Past medical history: asthma, seasonal allergies, IBS  Congestion: yes  Sore throat: yes  Cough: yes  Sputum production: yes, occasionally; white  Fever: yes, tmax 102F last night  Body aches: yes  Loss of smell/taste: no  GI symptoms: no  Known sick contacts: yes, niece sick with similar  OTC meds tried: advil yesterday          Review of Systems   Review of Systems   Constitutional:         Negative except as noted in HPI   Respiratory:  Negative for shortness of breath. Cardiovascular:  Negative for chest pain.          Current Medications       Current Outpatient Medications:     cetirizine (ZyrTEC) 10 mg tablet, Take 10 mg by mouth daily as needed for allergies, Disp: , Rfl:     levonorgestrel (MIRENA) 20 MCG/24HR IUD, 1 each by Intrauterine route once, Disp: , Rfl:     linaCLOtide (Linzess) 72 MCG CAPS, Take 72 mcg by mouth in the morning, Disp: 30 capsule, Rfl: 3    albuterol (PROVENTIL HFA,VENTOLIN HFA) 90 mcg/act inhaler, Inhale 2 puffs every 4 (four) hours as needed for wheezing (Patient not taking: Reported on 11/16/2023), Disp: 18 g, Rfl: 0    psyllium (METAMUCIL) 58.6 % packet, Take 1 packet by mouth daily (Patient not taking: Reported on 11/16/2023), Disp: , Rfl:     valACYclovir (VALTREX) 500 mg tablet, Take 1 tablet (500 mg total) by mouth daily (Patient not taking: Reported on 11/16/2023), Disp: 90 tablet, Rfl: 1    Current Allergies     Allergies as of 11/16/2023    (No Known Allergies)            The following portions of the patient's history were reviewed and updated as appropriate: allergies, current medications, past family history, past medical history, past social history, past surgical history and problem list.     Past Medical History:   Diagnosis Date    Allergic rhinitis     Asthma     HPV (human papilloma virus) infection     Urinary tract infection        Past Surgical History:   Procedure Laterality Date    NO PAST SURGERIES         Family History   Problem Relation Age of Onset    Asthma Maternal Grandmother     Diabetes Paternal Grandmother     Diabetes Paternal Grandfather          Medications have been verified. Objective   Pulse 88   Temp 99 °F (37.2 °C)   Resp 20   Ht 5' 4" (1.626 m)   Wt 112 kg (248 lb)   SpO2 99%   BMI 42.57 kg/m²        Physical Exam     Physical Exam  Vitals and nursing note reviewed. Constitutional:       General: She is not in acute distress. Appearance: Normal appearance. She is not toxic-appearing. HENT:      Head: Normocephalic and atraumatic.       Right Ear: Tympanic membrane, ear canal and external ear normal.      Left Ear: Tympanic membrane, ear canal and external ear normal.      Nose: No congestion. Mouth/Throat:      Mouth: Mucous membranes are moist.      Pharynx: Oropharynx is clear. No oropharyngeal exudate or posterior oropharyngeal erythema. Eyes:      Conjunctiva/sclera: Conjunctivae normal.      Pupils: Pupils are equal, round, and reactive to light. Cardiovascular:      Rate and Rhythm: Normal rate and regular rhythm. Heart sounds: Normal heart sounds. Pulmonary:      Effort: Pulmonary effort is normal. No respiratory distress. Breath sounds: Normal breath sounds. No wheezing, rhonchi or rales. Musculoskeletal:      Cervical back: Normal range of motion and neck supple. Skin:     General: Skin is warm and dry. Capillary Refill: Capillary refill takes less than 2 seconds. Neurological:      Mental Status: She is alert and oriented to person, place, and time.    Psychiatric:         Behavior: Behavior normal.

## 2023-11-16 NOTE — LETTER
[facility] [address] [address] [phone]  Dept: [phone]    November 16, 2023    Patient: Tyler Jacobo  YOB: 1985    Tyler Jacobo was seen and evaluated at our [facility]. Please note if Covid and Flu tests are negative, they may return to work when fever free for 24 hours without the use of a fever reducing agent. If Covid or Flu test is positive, they may return to work on 11/20/2023, as this is 5 days from the onset of symptoms. Upon return, they must then adhere to strict masking for an additional 5 days.     Sincerely,    Chandra Zhu PA-C

## 2023-11-17 LAB
FLUAV RNA RESP QL NAA+PROBE: NEGATIVE
FLUBV RNA RESP QL NAA+PROBE: NEGATIVE
SARS-COV-2 RNA RESP QL NAA+PROBE: NEGATIVE

## 2024-01-11 DIAGNOSIS — K58.1 IRRITABLE BOWEL SYNDROME WITH CONSTIPATION: ICD-10-CM

## 2024-01-11 RX ORDER — LINACLOTIDE 72 UG/1
CAPSULE, GELATIN COATED ORAL
Qty: 90 CAPSULE | Refills: 1 | Status: SHIPPED | OUTPATIENT
Start: 2024-01-11

## 2024-03-01 ENCOUNTER — TELEPHONE (OUTPATIENT)
Age: 39
End: 2024-03-01

## 2024-03-01 DIAGNOSIS — B00.9 HSV INFECTION: ICD-10-CM

## 2024-03-01 RX ORDER — VALACYCLOVIR HYDROCHLORIDE 500 MG/1
500 TABLET, FILM COATED ORAL DAILY
Qty: 90 TABLET | Refills: 0 | Status: SHIPPED | OUTPATIENT
Start: 2024-03-01 | End: 2024-05-30

## 2024-03-01 NOTE — TELEPHONE ENCOUNTER
Sent message to the patient, requesting her to contact us to schedule a yearly appointment so that we can refill her medication.

## 2024-03-04 RX ORDER — VALACYCLOVIR HYDROCHLORIDE 500 MG/1
500 TABLET, FILM COATED ORAL DAILY
Qty: 90 TABLET | Refills: 1 | Status: SHIPPED | OUTPATIENT
Start: 2024-03-04 | End: 2024-03-04

## 2024-03-12 DIAGNOSIS — B00.9 HSV INFECTION: ICD-10-CM

## 2024-03-12 RX ORDER — VALACYCLOVIR HYDROCHLORIDE 500 MG/1
500 TABLET, FILM COATED ORAL DAILY
Qty: 90 TABLET | Refills: 0 | Status: SHIPPED | OUTPATIENT
Start: 2024-03-12 | End: 2024-06-10

## 2024-05-10 ENCOUNTER — ANNUAL EXAM (OUTPATIENT)
Dept: OBGYN CLINIC | Facility: CLINIC | Age: 39
End: 2024-05-10
Payer: COMMERCIAL

## 2024-05-10 ENCOUNTER — APPOINTMENT (OUTPATIENT)
Dept: LAB | Facility: AMBULARY SURGERY CENTER | Age: 39
End: 2024-05-10
Payer: COMMERCIAL

## 2024-05-10 VITALS
SYSTOLIC BLOOD PRESSURE: 128 MMHG | BODY MASS INDEX: 42.17 KG/M2 | HEIGHT: 64 IN | WEIGHT: 247 LBS | DIASTOLIC BLOOD PRESSURE: 82 MMHG

## 2024-05-10 DIAGNOSIS — Z11.3 SCREEN FOR STD (SEXUALLY TRANSMITTED DISEASE): ICD-10-CM

## 2024-05-10 DIAGNOSIS — Z12.31 ENCOUNTER FOR SCREENING MAMMOGRAM FOR MALIGNANT NEOPLASM OF BREAST: ICD-10-CM

## 2024-05-10 DIAGNOSIS — Z01.419 WELL WOMAN EXAM WITH ROUTINE GYNECOLOGICAL EXAM: Primary | ICD-10-CM

## 2024-05-10 LAB
HBV SURFACE AG SER QL: NORMAL
HCV AB SER QL: NORMAL
HIV 1+2 AB+HIV1 P24 AG SERPL QL IA: NORMAL
HIV 2 AB SERPL QL IA: NORMAL
HIV1 AB SERPL QL IA: NORMAL
HIV1 P24 AG SERPL QL IA: NORMAL
TREPONEMA PALLIDUM IGG+IGM AB [PRESENCE] IN SERUM OR PLASMA BY IMMUNOASSAY: NORMAL

## 2024-05-10 PROCEDURE — 99395 PREV VISIT EST AGE 18-39: CPT

## 2024-05-10 PROCEDURE — 87591 N.GONORRHOEAE DNA AMP PROB: CPT

## 2024-05-10 PROCEDURE — 86780 TREPONEMA PALLIDUM: CPT

## 2024-05-10 PROCEDURE — 87340 HEPATITIS B SURFACE AG IA: CPT

## 2024-05-10 PROCEDURE — 87491 CHLMYD TRACH DNA AMP PROBE: CPT

## 2024-05-10 PROCEDURE — 86803 HEPATITIS C AB TEST: CPT

## 2024-05-10 PROCEDURE — 36415 COLL VENOUS BLD VENIPUNCTURE: CPT

## 2024-05-10 PROCEDURE — 87389 HIV-1 AG W/HIV-1&-2 AB AG IA: CPT

## 2024-05-10 NOTE — PROGRESS NOTES
ASSESSMENT & PLAN:   Diagnoses and all orders for this visit:    Well woman exam with routine gynecological exam    Encounter for screening mammogram for malignant neoplasm of breast  -     Mammo screening bilateral w 3d & cad; Future    Screen for STD (sexually transmitted disease)  -     Chlamydia/GC amplified DNA by PCR  -     Hepatitis B surface antigen; Future  -     Hepatitis C antibody; Future  -     HIV 1/2 AG/AB w Reflex [facility] for 2 yr old and above; Future  -     RPR-Syphilis Screening (Total Syphilis IGG/IGM); Future    The following were reviewed in today's visit: ASCCP guidelines, Gardisil vaccination, STD testing breast self exam, mammography screening ordered, STD testing, exercise, and healthy diet.    Patient to return to office in yearly for annual exam.     All questions have been answered to her satisfaction.    CC:  Annual Gynecologic Examination  Chief Complaint   Patient presents with    Gynecologic Exam     Patient is here today for her yearly exam, pap up to date(23-wnl), mammo order placed. Patient is doing well, she has no concerns at this time.        HPI: Neha Powers is a 38 y.o.  who presents for annual gynecologic examination.  She has the following concerns:  none.   Mirena IUD - inserted in . Patient does not get a period with IUD.   She is sexually active with 1 partner. Would like STD screening tests today. Patient will be due for mammogram next year, after she turns 38 y/o. Order placed now.     Health Maintenance:    Exercise: daily  Breast exams/breast awareness: yes    Past Medical History:   Diagnosis Date    Allergic rhinitis     Asthma     HPV (human papilloma virus) infection     Urinary tract infection        Past Surgical History:   Procedure Laterality Date    NO PAST SURGERIES         Past OB/Gyn History:   No LMP recorded (lmp unknown). Patient has had an implant.    Last Pap: 2023 : no abnormalities  History of abnormal Pap smear: no  HPV  vaccine completed: yes    Patient is currently sexually active.   STD testing: yes  Current contraception: IUD    Family History  Family History   Problem Relation Age of Onset    Asthma Maternal Grandmother     Diabetes Paternal Grandmother     Diabetes Paternal Grandfather      Family history of uterine or ovarian cancer: yes, believes her cousin had ovarian cancer  Family history of breast cancer: no  Family history of colon cancer: no    Social History:  Social History     Socioeconomic History    Marital status: Single     Spouse name: Not on file    Number of children: Not on file    Years of education: Not on file    Highest education level: Not on file   Occupational History    Not on file   Tobacco Use    Smoking status: Former     Current packs/day: 0.00     Average packs/day: 0.3 packs/day for 16.0 years (4.0 ttl pk-yrs)     Types: Cigarettes     Start date: 6/15/2002     Quit date: 2018     Years since quittin.0    Smokeless tobacco: Never   Vaping Use    Vaping status: Never Used   Substance and Sexual Activity    Alcohol use: Yes     Comment: special occasions    Drug use: Yes     Types: Marijuana     Comment: Medical Card     Sexual activity: Yes     Partners: Male     Birth control/protection: I.U.D.   Other Topics Concern    Not on file   Social History Narrative    Not on file     Social Determinants of Health     Financial Resource Strain: Not on file   Food Insecurity: Not on file   Transportation Needs: Not on file   Physical Activity: Not on file   Stress: Not on file   Social Connections: Not on file   Intimate Partner Violence: Not on file   Housing Stability: Not on file     Domestic violence screen: negative    Allergies:  No Known Allergies    Medications:    Current Outpatient Medications:     cetirizine (ZyrTEC) 10 mg tablet, Take 10 mg by mouth daily as needed for allergies, Disp: , Rfl:     levonorgestrel (MIRENA) 20 MCG/24HR IUD, 1 each by Intrauterine route once, Disp: ,  "Rfl:     linaCLOtide (Linzess) 72 MCG CAPS, TAKE 1 CAPSULE BY MOUTH EVERY DAY IN THE MORNING, Disp: 90 capsule, Rfl: 1    valACYclovir (VALTREX) 500 mg tablet, Take 1 tablet (500 mg total) by mouth daily, Disp: 90 tablet, Rfl: 0    albuterol (PROVENTIL HFA,VENTOLIN HFA) 90 mcg/act inhaler, Inhale 2 puffs every 4 (four) hours as needed for wheezing (Patient not taking: Reported on 11/16/2023), Disp: 18 g, Rfl: 0    psyllium (METAMUCIL) 58.6 % packet, Take 1 packet by mouth daily (Patient not taking: Reported on 11/16/2023), Disp: , Rfl:     Review of Systems:  Review of Systems   Constitutional:  Negative for chills and fever.   Respiratory:  Negative for shortness of breath.    Cardiovascular:  Negative for chest pain.   Gastrointestinal:  Negative for abdominal pain, constipation and diarrhea.   Genitourinary:  Negative for dysuria, frequency, pelvic pain, vaginal discharge and vaginal pain.   Psychiatric/Behavioral:  Negative for self-injury and suicidal ideas.          Physical Exam:  /82 (BP Location: Right arm, Patient Position: Sitting, Cuff Size: Large)   Ht 5' 4\" (1.626 m)   Wt 112 kg (247 lb)   LMP  (LMP Unknown)   BMI 42.40 kg/m²    Physical Exam  Constitutional:       Appearance: Normal appearance.   Genitourinary:      Vulva and urethral meatus normal.      No labial fusion noted.      No vaginal erythema or tenderness.        Right Adnexa: not tender.     Left Adnexa: not tender.     No cervical discharge or friability.      IUD strings visualized.      Uterus is not tender.   Breasts:     Breasts are symmetrical.      Right: Normal.      Left: Normal.   HENT:      Head: Normocephalic and atraumatic.   Neck:      Thyroid: No thyroid mass or thyroid tenderness.   Cardiovascular:      Rate and Rhythm: Normal rate and regular rhythm.      Heart sounds: Normal heart sounds. No murmur heard.  Pulmonary:      Effort: Pulmonary effort is normal.      Breath sounds: Normal breath sounds. No wheezing. "   Abdominal:      Palpations: Abdomen is soft. There is no mass.      Tenderness: There is no abdominal tenderness.   Lymphadenopathy:      Cervical: No cervical adenopathy.   Neurological:      General: No focal deficit present.      Mental Status: She is alert and oriented to person, place, and time.   Skin:     General: Skin is warm and dry.   Psychiatric:         Mood and Affect: Mood normal.         Behavior: Behavior normal.   Vitals and nursing note reviewed.

## 2024-05-13 LAB
C TRACH DNA SPEC QL NAA+PROBE: NEGATIVE
N GONORRHOEA DNA SPEC QL NAA+PROBE: NEGATIVE

## 2024-06-07 DIAGNOSIS — B00.9 HSV INFECTION: ICD-10-CM

## 2024-06-07 RX ORDER — VALACYCLOVIR HYDROCHLORIDE 500 MG/1
500 TABLET, FILM COATED ORAL DAILY
Qty: 90 TABLET | Refills: 1 | Status: SHIPPED | OUTPATIENT
Start: 2024-06-07 | End: 2024-12-04

## 2024-07-18 ENCOUNTER — RA CDI HCC (OUTPATIENT)
Dept: OTHER | Facility: HOSPITAL | Age: 39
End: 2024-07-18

## 2024-07-18 DIAGNOSIS — K58.1 IRRITABLE BOWEL SYNDROME WITH CONSTIPATION: ICD-10-CM

## 2024-07-18 RX ORDER — LINACLOTIDE 72 UG/1
CAPSULE, GELATIN COATED ORAL
Qty: 100 CAPSULE | Refills: 1 | Status: SHIPPED | OUTPATIENT
Start: 2024-07-18

## 2024-07-18 NOTE — PROGRESS NOTES
HCC coding opportunities       Chart reviewed, no opportunity found: CHART REVIEWED, NO OPPORTUNITY FOUND        Patients Insurance        Commercial Insurance: [employer]

## 2024-07-25 ENCOUNTER — OFFICE VISIT (OUTPATIENT)
Dept: FAMILY MEDICINE CLINIC | Facility: CLINIC | Age: 39
End: 2024-07-25
Payer: COMMERCIAL

## 2024-07-25 VITALS
SYSTOLIC BLOOD PRESSURE: 126 MMHG | TEMPERATURE: 99 F | HEIGHT: 65 IN | DIASTOLIC BLOOD PRESSURE: 84 MMHG | WEIGHT: 248 LBS | RESPIRATION RATE: 16 BRPM | BODY MASS INDEX: 41.32 KG/M2 | HEART RATE: 78 BPM

## 2024-07-25 DIAGNOSIS — Z13.0 SCREENING FOR DEFICIENCY ANEMIA: ICD-10-CM

## 2024-07-25 DIAGNOSIS — G56.03 BILATERAL CARPAL TUNNEL SYNDROME: ICD-10-CM

## 2024-07-25 DIAGNOSIS — L98.9 SKIN LESION OF FACE: ICD-10-CM

## 2024-07-25 DIAGNOSIS — J20.9 ACUTE BRONCHITIS, UNSPECIFIED ORGANISM: ICD-10-CM

## 2024-07-25 DIAGNOSIS — Z00.00 WELL ADULT EXAM: Primary | ICD-10-CM

## 2024-07-25 DIAGNOSIS — Z13.6 SCREENING FOR CARDIOVASCULAR, RESPIRATORY, AND GENITOURINARY DISEASES: ICD-10-CM

## 2024-07-25 DIAGNOSIS — Z13.29 SCREENING FOR THYROID DISORDER: ICD-10-CM

## 2024-07-25 DIAGNOSIS — Z13.83 SCREENING FOR CARDIOVASCULAR, RESPIRATORY, AND GENITOURINARY DISEASES: ICD-10-CM

## 2024-07-25 DIAGNOSIS — Z13.89 SCREENING FOR CARDIOVASCULAR, RESPIRATORY, AND GENITOURINARY DISEASES: ICD-10-CM

## 2024-07-25 PROCEDURE — 99395 PREV VISIT EST AGE 18-39: CPT | Performed by: FAMILY MEDICINE

## 2024-07-25 RX ORDER — ALBUTEROL SULFATE 90 UG/1
2 AEROSOL, METERED RESPIRATORY (INHALATION) EVERY 4 HOURS PRN
Qty: 18 G | Refills: 0 | Status: SHIPPED | OUTPATIENT
Start: 2024-07-25

## 2024-07-25 NOTE — PROGRESS NOTES
Adult Annual Physical  Name: Neha Powers      : 1985      MRN: [id number]  Encounter Provider: Manda Barrett MD  Encounter Date: 2024   Encounter department: [facility]    Assessment & Plan   1. Well adult exam  2. Screening for deficiency anemia  -     CBC and differential; Future  -     CBC and differential  3. Screening for cardiovascular, respiratory, and genitourinary diseases  -     Comprehensive metabolic panel; Future  -     Lipid Panel with Direct LDL reflex; Future  -     Comprehensive metabolic panel  -     Lipid Panel with Direct LDL reflex  4. Screening for thyroid disorder  -     TSH, 3rd generation with Free T4 reflex; Future  -     TSH, 3rd generation with Free T4 reflex  5. Skin lesion of face  -     Ambulatory Referral to Dermatology; Future  6. Bilateral carpal tunnel syndrome  -     Ambulatory referral to Orthopedic Surgery; Future  7. Acute bronchitis, unspecified organism  -     albuterol (PROVENTIL HFA,VENTOLIN HFA) 90 mcg/act inhaler; Inhale 2 puffs every 4 (four) hours as needed for wheezing    Immunizations and preventive care screenings were discussed with patient today. Appropriate education was printed on patient's after visit summary.        Depression Screening and Follow-up Plan: Patient was screened for depression during today's encounter. They screened negative with a PHQ-2 score of 0.        History of Present Illness     Adult Annual Physical:  Patient presents for annual physical.     Diet and Physical Activity:  - Diet/Nutrition: well balanced diet.  - Exercise: walking.    Depression Screening:  - PHQ-2 Score: 0    General Health:  - Sleep: sleeps well.  - Hearing: normal hearing bilateral ears.  - Vision: goes for regular eye exams, no vision problems and wears glasses.  - Dental: regular dental visits and brushes teeth twice daily.    /GYN Health:  - Follows with GYN: yes.     Review of Systems   Constitutional: Negative.    HENT: Negative.    "  Eyes: Negative.    Respiratory: Negative.     Cardiovascular: Negative.    Gastrointestinal: Negative.    Endocrine: Negative.    Genitourinary: Negative.    Musculoskeletal: Negative.    Skin: Negative.    Allergic/Immunologic: Negative.    Neurological: Negative.    Hematological: Negative.    Psychiatric/Behavioral: Negative.           Objective     /84   Pulse 78   Temp 99 °F (37.2 °C)   Resp 16   Ht 5' 4.5\" (1.638 m)   Wt 112 kg (248 lb)   BMI 41.91 kg/m²     Physical Exam  Vitals and nursing note reviewed.   Constitutional:       General: She is not in acute distress.     Appearance: She is well-developed.   HENT:      Head: Normocephalic and atraumatic.      Right Ear: Tympanic membrane, ear canal and external ear normal. There is no impacted cerumen.      Left Ear: Tympanic membrane, ear canal and external ear normal. There is no impacted cerumen.      Nose: Nose normal.      Mouth/Throat:      Mouth: Mucous membranes are moist.   Eyes:      Conjunctiva/sclera: Conjunctivae normal.   Cardiovascular:      Rate and Rhythm: Normal rate and regular rhythm.      Heart sounds: No murmur heard.  Pulmonary:      Effort: Pulmonary effort is normal. No respiratory distress.      Breath sounds: Normal breath sounds.   Abdominal:      General: Abdomen is flat. There is no distension.      Palpations: Abdomen is soft. There is no mass.      Tenderness: There is no abdominal tenderness. There is no guarding or rebound.      Hernia: No hernia is present.   Musculoskeletal:         General: Normal range of motion.      Cervical back: Neck supple.   Skin:     General: Skin is warm and dry.   Neurological:      General: No focal deficit present.      Mental Status: She is alert and oriented to person, place, and time.         "

## 2024-07-26 LAB
ALBUMIN SERPL-MCNC: 4.3 G/DL (ref 3.9–4.9)
ALP SERPL-CCNC: 70 IU/L (ref 44–121)
ALT SERPL-CCNC: 25 IU/L (ref 0–32)
AST SERPL-CCNC: 22 IU/L (ref 0–40)
BASOPHILS # BLD AUTO: 0.1 X10E3/UL (ref 0–0.2)
BASOPHILS NFR BLD AUTO: 1 %
BILIRUB SERPL-MCNC: 0.2 MG/DL (ref 0–1.2)
BUN SERPL-MCNC: 12 MG/DL (ref 6–20)
BUN/CREAT SERPL: 14 (ref 9–23)
CALCIUM SERPL-MCNC: 9.3 MG/DL (ref 8.7–10.2)
CHLORIDE SERPL-SCNC: 101 MMOL/L (ref 96–106)
CHOLEST SERPL-MCNC: 203 MG/DL (ref 100–199)
CO2 SERPL-SCNC: 23 MMOL/L (ref 20–29)
CREAT SERPL-MCNC: 0.84 MG/DL (ref 0.57–1)
EGFR: 91 ML/MIN/1.73
EOSINOPHIL # BLD AUTO: 0.1 X10E3/UL (ref 0–0.4)
EOSINOPHIL NFR BLD AUTO: 2 %
ERYTHROCYTE [DISTWIDTH] IN BLOOD BY AUTOMATED COUNT: 13.2 % (ref 11.7–15.4)
GLOBULIN SER-MCNC: 2.4 G/DL (ref 1.5–4.5)
GLUCOSE SERPL-MCNC: 104 MG/DL (ref 70–99)
HCT VFR BLD AUTO: 38.7 % (ref 34–46.6)
HDLC SERPL-MCNC: 77 MG/DL
HGB BLD-MCNC: 12.7 G/DL (ref 11.1–15.9)
IMM GRANULOCYTES # BLD: 0 X10E3/UL (ref 0–0.1)
IMM GRANULOCYTES NFR BLD: 1 %
LDLC SERPL CALC-MCNC: 111 MG/DL (ref 0–99)
LDLC/HDLC SERPL: 1.4 RATIO (ref 0–3.2)
LYMPHOCYTES # BLD AUTO: 2.2 X10E3/UL (ref 0.7–3.1)
LYMPHOCYTES NFR BLD AUTO: 33 %
MCH RBC QN AUTO: 29.5 PG (ref 26.6–33)
MCHC RBC AUTO-ENTMCNC: 32.8 G/DL (ref 31.5–35.7)
MCV RBC AUTO: 90 FL (ref 79–97)
MICRODELETION SYND BLD/T FISH: NORMAL
MONOCYTES # BLD AUTO: 0.4 X10E3/UL (ref 0.1–0.9)
MONOCYTES NFR BLD AUTO: 7 %
NEUTROPHILS # BLD AUTO: 3.8 X10E3/UL (ref 1.4–7)
NEUTROPHILS NFR BLD AUTO: 56 %
PLATELET # BLD AUTO: 269 X10E3/UL (ref 150–450)
POTASSIUM SERPL-SCNC: 4.3 MMOL/L (ref 3.5–5.2)
PROT SERPL-MCNC: 6.7 G/DL (ref 6–8.5)
RBC # BLD AUTO: 4.3 X10E6/UL (ref 3.77–5.28)
SL AMB VLDL CHOLESTEROL CALC: 15 MG/DL (ref 5–40)
SODIUM SERPL-SCNC: 138 MMOL/L (ref 134–144)
TRIGL SERPL-MCNC: 86 MG/DL (ref 0–149)
TSH SERPL DL<=0.005 MIU/L-ACNC: 1.34 UIU/ML (ref 0.45–4.5)
WBC # BLD AUTO: 6.6 X10E3/UL (ref 3.4–10.8)

## 2024-08-09 ENCOUNTER — OFFICE VISIT (OUTPATIENT)
Dept: OBGYN CLINIC | Facility: CLINIC | Age: 39
End: 2024-08-09
Payer: COMMERCIAL

## 2024-08-09 VITALS
WEIGHT: 248 LBS | BODY MASS INDEX: 41.32 KG/M2 | DIASTOLIC BLOOD PRESSURE: 85 MMHG | SYSTOLIC BLOOD PRESSURE: 135 MMHG | HEART RATE: 64 BPM | HEIGHT: 65 IN

## 2024-08-09 DIAGNOSIS — G56.03 BILATERAL CARPAL TUNNEL SYNDROME: ICD-10-CM

## 2024-08-09 PROCEDURE — 99203 OFFICE O/P NEW LOW 30 MIN: CPT | Performed by: PHYSICIAN ASSISTANT

## 2024-08-12 NOTE — PROGRESS NOTES
ASSESSMENT/PLAN:    Assessment:   Carpal Tunnel Syndrome  bilateral    Plan:   Bracing - nighttime splinting  US to assess compression    Follow Up:  8  week(s)    To Do Next Visit:  reassess    General Discussions:  Carpal Tunnel Syndrome: The anatomy and physiology of carpal tunnel syndrome was discussed with the patient today.  Increase pressure localized under the transverse carpal ligament can cause pain, numbness, tingling, or dysesthesias within the median nerve distribution as well as feelings of fatigue, clumsiness, or awkwardness.  These symptoms typically occur at night and worse in the morning upon waking.  Eventually, untreated carpal tunnel syndrome can result in weakness and permanent loss of muscle within the thenar compartment of the hand.  Treatment options were discussed with the patient.  Conservative treatment includes nocturnal resting splints to keep the nerve in a neutral position, ergonomic changes within the work or home environment, activity modification, and tendon gliding exercises.  Steroid injections within the carpal canal can help a majority of patients, however this is often self-limited in a majority of patients.  Surgical intervention to divide the transverse carpal ligament typically results in a long-lasting relief of the patient's complaints, with the recurrence rate of less than 1%.       _____________________________________________________  CHIEF COMPLAINT:  Chief Complaint   Patient presents with    Left Wrist - Numbness    Right Wrist - Numbness         SUBJECTIVE:  Neha Powers is a 38 y.o. female who presents with bilateral hand numbness and tingling. Patient states that the pain wakes her up at night and her hands are numb. She notes that she has to shake her hands out. She denies any injuries.. she has not had any intervention.     PAST MEDICAL HISTORY:  Past Medical History:   Diagnosis Date    Allergic rhinitis     Asthma     HPV (human papilloma virus) infection   "   Urinary tract infection        PAST SURGICAL HISTORY:  Past Surgical History:   Procedure Laterality Date    NO PAST SURGERIES         FAMILY HISTORY:  Family History   Problem Relation Age of Onset    Asthma Maternal Grandmother     Diabetes Paternal Grandmother     Diabetes Paternal Grandfather        SOCIAL HISTORY:  Social History     Tobacco Use    Smoking status: Former     Current packs/day: 0.00     Average packs/day: 0.3 packs/day for 16.0 years (4.0 ttl pk-yrs)     Types: Cigarettes     Start date: 6/15/2002     Quit date: 2018     Years since quittin.2     Passive exposure: Current    Smokeless tobacco: Never   Vaping Use    Vaping status: Never Used   Substance Use Topics    Alcohol use: Yes     Comment: special occasions    Drug use: Yes     Types: Marijuana     Comment: Medical Card        MEDICATIONS:    Current Outpatient Medications:     albuterol (PROVENTIL HFA,VENTOLIN HFA) 90 mcg/act inhaler, Inhale 2 puffs every 4 (four) hours as needed for wheezing, Disp: 18 g, Rfl: 0    cetirizine (ZyrTEC) 10 mg tablet, Take 10 mg by mouth daily as needed for allergies, Disp: , Rfl:     levonorgestrel (MIRENA) 20 MCG/24HR IUD, 1 each by Intrauterine route once, Disp: , Rfl:     linaCLOtide (Linzess) 72 MCG CAPS, TAKE 1 CAPSULE BY MOUTH EVERY DAY IN THE MORNING, Disp: 100 capsule, Rfl: 1    psyllium (METAMUCIL) 58.6 % packet, Take 1 packet by mouth daily, Disp: , Rfl:     valACYclovir (VALTREX) 500 mg tablet, Take 1 tablet (500 mg total) by mouth daily, Disp: 90 tablet, Rfl: 1    ALLERGIES:  No Known Allergies    REVIEW OF SYSTEMS:  Pertinent items are noted in HPI.  A comprehensive review of systems was negative.    LABS:  HgA1c: No results found for: \"HGBA1C\"  BMP:   Lab Results   Component Value Date    K 4.3 2024    CO2 23 2024     2024    BUN 12 2024    CREATININE 0.84 2024         _____________________________________________________  PHYSICAL " "EXAMINATION:  Vital signs: /85   Pulse 64   Ht 5' 4.5\" (1.638 m)   Wt 112 kg (248 lb)   BMI 41.91 kg/m²   General: well developed and well nourished, alert, oriented times 3, and appears comfortable  Psychiatric: Normal  HEENT: Trachea Midline, No torticollis  Cardiovascular: No discernable arrhythmia  Pulmonary: No wheezing or stridor  Abdomen: No rebound or guarding  Extremities: No peripheral edema  Skin: No masses, erythema, lacerations, fluctation, ulcerations  Neurovascular: Sensation Intact to the Median, Ulnar, Radial Nerve, Motor Intact to the Median, Ulnar, Radial Nerve, and Pulses Intact    MUSCULOSKELETAL EXAMINATION:  LEFT SIDE:  Carpal tunnel:  Weakness APB and Postive Tinel's  RIGHT SIDE:  Carpal tunnel:  Weakness APB and Postive Tinel's    _____________________________________________________  STUDIES REVIEWED:  No Studies to review      PROCEDURES PERFORMED:  Procedures  No Procedures performed today    "

## 2024-08-19 ENCOUNTER — HOSPITAL ENCOUNTER (OUTPATIENT)
Dept: RADIOLOGY | Facility: HOSPITAL | Age: 39
Discharge: HOME | End: 2024-08-19
Payer: COMMERCIAL

## 2024-08-19 DIAGNOSIS — G56.03 BILATERAL CARPAL TUNNEL SYNDROME: ICD-10-CM

## 2024-08-19 PROCEDURE — 76882 US LMTD JT/FCL EVL NVASC XTR: CPT

## 2024-08-27 ENCOUNTER — TELEPHONE (OUTPATIENT)
Age: 39
End: 2024-08-27

## 2024-08-27 DIAGNOSIS — J20.9 ACUTE BRONCHITIS, UNSPECIFIED ORGANISM: ICD-10-CM

## 2024-08-27 NOTE — TELEPHONE ENCOUNTER
Patients GI provider:  Natasha    Number to return call: [phone]    Reason for call: Pt calling to see if she can get an increase in medication linzess.  Stated taking one is not helping but if she takes 2 it helps.  Schedule an appointment first available 2/4.  Please contact patient to confirm.     Scheduled procedure/appointment date if applicable: Appt 2/4

## 2024-08-28 RX ORDER — ALBUTEROL SULFATE 90 UG/1
AEROSOL, METERED RESPIRATORY (INHALATION)
Qty: 6.7 G | Refills: 1 | Status: SHIPPED | OUTPATIENT
Start: 2024-08-28

## 2024-08-29 DIAGNOSIS — K59.00 CONSTIPATION, UNSPECIFIED CONSTIPATION TYPE: Primary | ICD-10-CM

## 2024-08-29 RX ORDER — LINACLOTIDE 145 UG/1
145 CAPSULE, GELATIN COATED ORAL
Qty: 30 CAPSULE | Refills: 2 | Status: SHIPPED | OUTPATIENT
Start: 2024-08-29

## 2024-08-29 NOTE — TELEPHONE ENCOUNTER
Called and spoke to patient she stated it started about two weeks ago she was having issues and started trying taking 2 of the linzess 145 mcg and was having a big improvement and is interested in taking the Linzess 290 mcg

## 2024-10-07 ENCOUNTER — OFFICE VISIT (OUTPATIENT)
Dept: OBGYN CLINIC | Facility: CLINIC | Age: 39
End: 2024-10-07
Payer: COMMERCIAL

## 2024-10-07 VITALS — HEIGHT: 65 IN | BODY MASS INDEX: 41.32 KG/M2 | WEIGHT: 248 LBS

## 2024-10-07 DIAGNOSIS — G56.03 BILATERAL CARPAL TUNNEL SYNDROME: Primary | ICD-10-CM

## 2024-10-07 DIAGNOSIS — G56.23 CUBITAL TUNNEL SYNDROME OF BOTH UPPER EXTREMITIES: ICD-10-CM

## 2024-10-07 PROCEDURE — 99214 OFFICE O/P EST MOD 30 MIN: CPT | Performed by: STUDENT IN AN ORGANIZED HEALTH CARE EDUCATION/TRAINING PROGRAM

## 2024-10-07 NOTE — PROGRESS NOTES
ORTHOPAEDIC HAND, WRIST, AND ELBOW OFFICE  VISIT     ASSESSMENT/PLAN:    Neha Powers is a 38 y.o. RHD female who presents with bilateral carpal tunnel and cubital tunnel syndrome L>R    Patient is a referral from Jaymie Chirinos after US of bilateral wrists to further evaluate carpal tunnel. Ultrasounds reviewed with patient and is consistent with clinical exam of bilateral carpal tunnel syndrome   On clinical exam today patient is also symptomatic of bilateral cubital tunnel, but no prior imaging/studies. Bilateral elbow US ordered for further evaluation  Continue with night time splinting as tolerated  Follow up after US to discuss surgery        The patient verbalized understanding of exam findings and treatment plan. We engaged in the shared decision-making process and treatment options were discussed at length with the patient. Surgical and conservative management discussed today along with risks and benefits.    Follow Up:  After US - review results, discuss surgery          Cubital Tunnel Syndrome: The anatomy and physiology of cubital tunnel syndrome were discussed with the patient today in the office.  Typically, increased elbow flexion activities decrease blood flow within the intraneural spaces, resulting in a feeling of numbness, tingling, weakness, or clumsiness within the hand and fingers.  Occasionally, anatomic structures such as medial elbow osteophytes, the medial head of the triceps, were subluxing ulnar nerve may result in increased pressure or aggravation at the cubital tunnel.  Typical signs and symptoms usually include numbness and tingling within the ring and small finger, weakness with , and weakness with pinch.  Conservative treatment and includes nocturnal bracing to keep the elbow in a semi-extended position, activity modification, therapy, and avoiding excessive elbow flexion activities.  A majority of patients typically respond to conservative treatment over a period of  approximately 3-6 months.  EMG/NCV testing of the ulnar nerve at the elbow is not as reliable as carpal tunnel syndrome.  Surgical intervention in the form of in situ release of the ulnar nerve at the elbow or ulnar nerve transposition may be required in up to 20% of patients.       ____________________________________________________________________________________________________________________________________________      CHIEF COMPLAINT:  Bilateral hand pain and numbness    SUBJECTIVE:  Neha Powers is a 38 y.o. female who presents for initial evaluation of bilateral hand numbness and tingling. Patient previously saw Luis Miguel Chirinos PA-C who ordered US for further evaluation. Patient has been experiencing persistent pain and numbness to bilateral hands over the last 5 months. The left side has become increasingly more painful with persistent numbness with no improvement with splinting. She recently had U/S performed for her carpal tunnels, but is also complaining of numbness to the ulnar aspect of her hand on bilateral extremities. Denies any trauma. Says the numbness on the left has been affecting her ability to perform her work activities.       Previous Treatments: bracing no  relief  Associated symptoms: Numbness  Handedness: right  Work status: [employer]     I have personally reviewed all the relevant PMH, PSH, SH, FH, Medications and allergies      PAST MEDICAL HISTORY:  Past Medical History:   Diagnosis Date    Allergic rhinitis     Asthma     HPV (human papilloma virus) infection     Urinary tract infection        PAST SURGICAL HISTORY:  Past Surgical History:   Procedure Laterality Date    NO PAST SURGERIES         FAMILY HISTORY:  Family History   Problem Relation Age of Onset    Asthma Maternal Grandmother     Diabetes Paternal Grandmother     Diabetes Paternal Grandfather        SOCIAL HISTORY:  Social History     Tobacco Use    Smoking status: Former     Current packs/day: 0.00      "Average packs/day: 0.3 packs/day for 16.0 years (4.0 ttl pk-yrs)     Types: Cigarettes     Start date: 6/15/2002     Quit date: 2018     Years since quittin.4     Passive exposure: Current    Smokeless tobacco: Never   Vaping Use    Vaping status: Never Used   Substance Use Topics    Alcohol use: Yes     Comment: special occasions    Drug use: Yes     Types: Marijuana     Comment: Medical Card        MEDICATIONS:    Current Outpatient Medications:     albuterol (PROVENTIL HFA,VENTOLIN HFA) 90 mcg/act inhaler, TAKE 2 PUFFS BY MOUTH EVERY 4 HOURS AS NEEDED FOR WHEEZE, Disp: 6.7 g, Rfl: 1    cetirizine (ZyrTEC) 10 mg tablet, Take 10 mg by mouth daily as needed for allergies, Disp: , Rfl:     levonorgestrel (MIRENA) 20 MCG/24HR IUD, 1 each by Intrauterine route once, Disp: , Rfl:     linaCLOtide (Linzess) 145 MCG CAPS, Take 1 capsule (145 mcg total) by mouth daily in the early morning, Disp: 30 capsule, Rfl: 2    linaCLOtide (Linzess) 72 MCG CAPS, TAKE 1 CAPSULE BY MOUTH EVERY DAY IN THE MORNING, Disp: 100 capsule, Rfl: 1    psyllium (METAMUCIL) 58.6 % packet, Take 1 packet by mouth daily, Disp: , Rfl:     valACYclovir (VALTREX) 500 mg tablet, Take 1 tablet (500 mg total) by mouth daily, Disp: 90 tablet, Rfl: 1    ALLERGIES:  No Known Allergies        REVIEW OF SYSTEMS:  Pertinent items are noted in HPI.  A comprehensive review of systems was negative.    VITALS:  Vitals:       LABS:  HgA1c: No results found for: \"HGBA1C\"  BMP:   Lab Results   Component Value Date    K 4.3 2024    CO2 23 2024     2024    BUN 12 2024    CREATININE 0.84 2024       _____________________________________________________  PHYSICAL EXAMINATION:  General: well developed and well nourished, alert, oriented times 3, and appears comfortable  Psychiatric: Normal  HEENT: Normocephalic, Atraumatic Trachea Midline, No torticollis  Pulmonary: No audible wheezing or respiratory distress   Abdomen/GI: Non " "tender, non distended   Cardiovascular: No pitting edema, 2+ radial pulse   Skin: No masses, erythema, lacerations, fluctation, ulcerations  Neurovascular: Sensation Intact to the Median, Ulnar, Radial Nerve, Motor Intact to the Median, Ulnar, Radial Nerve, and Pulses Intact  Musculoskeletal: Normal, except as noted in detailed exam and in HPI.        FOCUSED MUSCULOSKELETAL EXAMINATION:  Right Upper Extremity  Inspection: skin intact, no notable deformity   Palpation:  NTTP  Neurologic:  5/5 elbow flexion, 5/5 elbow extension, 5/5 wrist extension, 5/5 wrist flexion, 5/5 finger flexion, 5/5 finger extension, 5/5 FPL, 5/5 EPL, 5/5 APB, 5/5 intrinsics, sensation intact to median, radial, and ulnar nerve distributions  Vascular: Palpable radial pulse, brisk cap refill <2sec, hand warm and well perfused  MSK:   RIGHT SIDE:  Carpal tunnel:  Weakness APB, Postive Tinel's, and Positive Derkin's Compression Test  Positive elbow flexion compression test    Left Upper Extremity  Inspection: skin intact, no notable deformity   Palpation:  NTTP  Neurologic:  5/5 elbow flexion, 5/5 elbow extension, 5/5 wrist extension, 5/5 wrist flexion, 5/5 finger flexion, 5/5 finger extension, 5/5 FPL, 5/5 EPL, 5/5 APB, 5/5 intrinsics, sensation intact to median, radial, and ulnar nerve distributions  Vascular: Palpable radial pulse, brisk cap refill <2sec, hand warm and well perfused  MSK:   LEFT SIDE:  Carpal tunnel:  Weakness APB, Postive Tinel's, and Positive Derkin's Compression Test  Positive elbow flexion compression test  ___________________________________________________  STUDIES REVIEWED:  I have personally reviewed and interpreted  US of bilateral wrist obtained on 8/19/24 which demonstrates findings suspicious for carpal tunnel based on presence of hypervascularity within the median nerve      LABS REVIEWED:    HgA1c: No results found for: \"HGBA1C\"  BMP:   Lab Results   Component Value Date    K 4.3 07/25/2024    CO2 23 07/25/2024 "     07/25/2024    BUN 12 07/25/2024    CREATININE 0.84 07/25/2024               PROCEDURES PERFORMED:    I agree with the history, physical examination, assessment and plan of care as documented above.    Getachew Sandoval M.D.  Attending, Orthopaedic Surgery  Hand, Wrist, and Elbow Surgery  [facility]

## 2024-10-29 ENCOUNTER — HOSPITAL ENCOUNTER (OUTPATIENT)
Dept: RADIOLOGY | Facility: HOSPITAL | Age: 39
Discharge: HOME | End: 2024-10-29
Attending: STUDENT IN AN ORGANIZED HEALTH CARE EDUCATION/TRAINING PROGRAM
Payer: COMMERCIAL

## 2024-10-29 DIAGNOSIS — G56.23 CUBITAL TUNNEL SYNDROME OF BOTH UPPER EXTREMITIES: ICD-10-CM

## 2024-10-29 DIAGNOSIS — J20.9 ACUTE BRONCHITIS, UNSPECIFIED ORGANISM: ICD-10-CM

## 2024-10-29 PROCEDURE — 76882 US LMTD JT/FCL EVL NVASC XTR: CPT

## 2024-10-29 RX ORDER — ALBUTEROL SULFATE 90 UG/1
INHALANT RESPIRATORY (INHALATION)
Qty: 18 G | Refills: 1 | Status: SHIPPED | OUTPATIENT
Start: 2024-10-29

## 2024-11-18 ENCOUNTER — OFFICE VISIT (OUTPATIENT)
Dept: OBGYN CLINIC | Facility: CLINIC | Age: 39
End: 2024-11-18
Payer: COMMERCIAL

## 2024-11-18 VITALS
BODY MASS INDEX: 41.32 KG/M2 | SYSTOLIC BLOOD PRESSURE: 117 MMHG | HEART RATE: 73 BPM | HEIGHT: 65 IN | WEIGHT: 248 LBS | DIASTOLIC BLOOD PRESSURE: 81 MMHG

## 2024-11-18 DIAGNOSIS — G56.23 CUBITAL TUNNEL SYNDROME OF BOTH UPPER EXTREMITIES: ICD-10-CM

## 2024-11-18 DIAGNOSIS — G56.03 BILATERAL CARPAL TUNNEL SYNDROME: Primary | ICD-10-CM

## 2024-11-18 PROCEDURE — 99214 OFFICE O/P EST MOD 30 MIN: CPT | Performed by: STUDENT IN AN ORGANIZED HEALTH CARE EDUCATION/TRAINING PROGRAM

## 2024-11-18 PROCEDURE — NC001 PR NO CHARGE: Performed by: STUDENT IN AN ORGANIZED HEALTH CARE EDUCATION/TRAINING PROGRAM

## 2024-11-18 RX ORDER — CEFAZOLIN SODIUM 2 G/50ML
2000 SOLUTION INTRAVENOUS ONCE
OUTPATIENT
Start: 2024-11-18 | End: 2024-11-18

## 2024-11-18 RX ORDER — CHLORHEXIDINE GLUCONATE ORAL RINSE 1.2 MG/ML
15 SOLUTION DENTAL ONCE
OUTPATIENT
Start: 2024-11-18 | End: 2024-11-18

## 2024-11-18 NOTE — PROGRESS NOTES
ORTHOPAEDIC HAND, WRIST, AND ELBOW OFFICE  VISIT     ASSESSMENT/PLAN:    Neha Powers is a 38 y.o. RHD female who presents with bilateral carpal tunnel and cubital tunnel syndrome L>R    We discussed that treatment options for carpal and cubital tunnel syndrome include nighttime splinting, injection, therapy and surgery.   The patient has exhausted conservative mgmt for carpal tunnel with recent U/S inconclusive for concomitant cubital syndrome. Clinically patient is significantly symptomatic and reproducible symptoms on clinical exam therefore if going to proceed I do recommend concomitant release of cubital tunnel on more symptomatic side at time of surgery.  The patient was interested in proceeding with left carpal and cubital tunnel surgery.  We reviewed risks of surgery, which include but are not limited to bleeding, infection, wound healing complications, damage to surrounding structures including possible nerve, vessel or tendon damage, need for additional procedures, recurrence, lack of improvement, stiffness, and scarring. No guarantees were made.      She expressed understanding and consented to proceed. We reviewed the expected postoperative course and that we will plan to remove her sutures in 10-14 days postoperatively and she will likely be able to return to work at 2-3 weeks postoperatively. She is happy with the plan.       The patient verbalized understanding of exam findings and treatment plan. We engaged in the shared decision-making process and treatment options were discussed at length with the patient. Surgical and conservative management discussed today along with risks and benefits.    Follow Up:  Post op         Cubital Tunnel Syndrome: The anatomy and physiology of cubital tunnel syndrome were discussed with the patient today in the office.  Typically, increased elbow flexion activities decrease blood flow within the intraneural spaces, resulting in a feeling of numbness, tingling,  weakness, or clumsiness within the hand and fingers.  Occasionally, anatomic structures such as medial elbow osteophytes, the medial head of the triceps, were subluxing ulnar nerve may result in increased pressure or aggravation at the cubital tunnel.  Typical signs and symptoms usually include numbness and tingling within the ring and small finger, weakness with , and weakness with pinch.  Conservative treatment and includes nocturnal bracing to keep the elbow in a semi-extended position, activity modification, therapy, and avoiding excessive elbow flexion activities.  A majority of patients typically respond to conservative treatment over a period of approximately 3-6 months.  EMG/NCV testing of the ulnar nerve at the elbow is not as reliable as carpal tunnel syndrome.  Surgical intervention in the form of in situ release of the ulnar nerve at the elbow or ulnar nerve transposition may be required in up to 20% of patients.       ____________________________________________________________________________________________________________________________________________      CHIEF COMPLAINT:  Bilateral hand pain and numbness    SUBJECTIVE:  Neha Powers is a 38 y.o. female who presents for initial evaluation of bilateral hand numbness and tingling. Patient previously saw Luis Miguel Chirinos PA-C who ordered US for further evaluation. Patient has been experiencing persistent pain and numbness to bilateral hands over the last 5 months. The left side has become increasingly more painful with persistent numbness with no improvement with splinting. She recently had U/S performed for her carpal tunnels, but is also complaining of numbness to the ulnar aspect of her hand on bilateral extremities. She was sent for repeat U/S of elbow and presents today to review results. Denies any trauma. Says the numbness on the left has been affecting her ability to perform her work activities.       Previous Treatments: bracing no   relief  Associated symptoms: Numbness  Handedness: right  Work status: [employer]     I have personally reviewed all the relevant PMH, PSH, SH, FH, Medications and allergies      PAST MEDICAL HISTORY:  Past Medical History:   Diagnosis Date    Allergic rhinitis     Asthma     HPV (human papilloma virus) infection     Urinary tract infection        PAST SURGICAL HISTORY:  Past Surgical History:   Procedure Laterality Date    NO PAST SURGERIES         FAMILY HISTORY:  Family History   Problem Relation Age of Onset    Asthma Maternal Grandmother     Diabetes Paternal Grandmother     Diabetes Paternal Grandfather        SOCIAL HISTORY:  Social History     Tobacco Use    Smoking status: Former     Current packs/day: 0.00     Average packs/day: 0.3 packs/day for 16.0 years (4.0 ttl pk-yrs)     Types: Cigarettes     Start date: 6/15/2002     Quit date: 2018     Years since quittin.5     Passive exposure: Current    Smokeless tobacco: Never   Vaping Use    Vaping status: Never Used   Substance Use Topics    Alcohol use: Yes     Comment: special occasions    Drug use: Yes     Types: Marijuana     Comment: Medical Card        MEDICATIONS:    Current Outpatient Medications:     albuterol (PROVENTIL HFA,VENTOLIN HFA) 90 mcg/act inhaler, INHALE 2 PUFFS BY MOUTH EVERY 4 HOURS AS NEEDED FOR WHEEZE, Disp: 18 g, Rfl: 1    cetirizine (ZyrTEC) 10 mg tablet, Take 10 mg by mouth daily as needed for allergies, Disp: , Rfl:     levonorgestrel (MIRENA) 20 MCG/24HR IUD, 1 each by Intrauterine route once, Disp: , Rfl:     linaCLOtide (Linzess) 145 MCG CAPS, Take 1 capsule (145 mcg total) by mouth daily in the early morning, Disp: 30 capsule, Rfl: 2    linaCLOtide (Linzess) 72 MCG CAPS, TAKE 1 CAPSULE BY MOUTH EVERY DAY IN THE MORNING, Disp: 100 capsule, Rfl: 1    psyllium (METAMUCIL) 58.6 % packet, Take 1 packet by mouth daily, Disp: , Rfl:     valACYclovir (VALTREX) 500 mg tablet, Take 1 tablet (500 mg total) by mouth  "daily, Disp: 90 tablet, Rfl: 1    ALLERGIES:  No Known Allergies        REVIEW OF SYSTEMS:  Pertinent items are noted in HPI.  A comprehensive review of systems was negative.    VITALS:  There were no vitals filed for this visit.      LABS:  HgA1c: No results found for: \"HGBA1C\"  BMP:   Lab Results   Component Value Date    K 4.3 07/25/2024    CO2 23 07/25/2024     07/25/2024    BUN 12 07/25/2024    CREATININE 0.84 07/25/2024       _____________________________________________________  PHYSICAL EXAMINATION:  General: well developed and well nourished, alert, oriented times 3, and appears comfortable  Psychiatric: Normal  HEENT: Normocephalic, Atraumatic Trachea Midline, No torticollis  Pulmonary: No audible wheezing or respiratory distress   Abdomen/GI: Non tender, non distended   Cardiovascular: No pitting edema, 2+ radial pulse   Skin: No masses, erythema, lacerations, fluctation, ulcerations  Neurovascular: Sensation Intact to the Median, Ulnar, Radial Nerve, Motor Intact to the Median, Ulnar, Radial Nerve, and Pulses Intact  Musculoskeletal: Normal, except as noted in detailed exam and in HPI.        FOCUSED MUSCULOSKELETAL EXAMINATION:  Right Upper Extremity  Inspection: skin intact, no notable deformity   Palpation:  NTTP  Neurologic:  5/5 elbow flexion, 5/5 elbow extension, 5/5 wrist extension, 5/5 wrist flexion, 5/5 finger flexion, 5/5 finger extension, 5/5 FPL, 5/5 EPL, 5/5 APB, 5/5 intrinsics, sensation intact to median, radial, and ulnar nerve distributions  Vascular: Palpable radial pulse, brisk cap refill <2sec, hand warm and well perfused  MSK:   RIGHT SIDE:  Carpal tunnel:  Weakness APB, Postive Tinel's, and Positive Derkin's Compression Test  Positive elbow flexion compression test    Left Upper Extremity  Inspection: skin intact, no notable deformity   Palpation:  NTTP  Neurologic:  5/5 elbow flexion, 5/5 elbow extension, 5/5 wrist extension, 5/5 wrist flexion, 5/5 finger flexion, 5/5 finger " "extension, 5/5 FPL, 5/5 EPL, 5/5 APB, 5/5 intrinsics, sensation intact to median, radial, and ulnar nerve distributions  Vascular: Palpable radial pulse, brisk cap refill <2sec, hand warm and well perfused  MSK:   LEFT SIDE:  Carpal tunnel:  Weakness APB, Postive Tinel's, and Positive Derkin's Compression Test  Positive elbow flexion compression test  ___________________________________________________  STUDIES REVIEWED:  I have personally reviewed and interpreted  US of bilateral wrist obtained on 8/19/24 which demonstrates findings suspicious for carpal tunnel based on presence of hypervascularity within the median nerve      U/S of bilateral elbow were independently reviewed which demonstrates both right and left ulnar nerve edema with increased cross sectional area indicative of possible cubital tunnel syndrome and ulnar neuropathy.       LABS REVIEWED:    HgA1c: No results found for: \"HGBA1C\"  BMP:   Lab Results   Component Value Date    K 4.3 07/25/2024    CO2 23 07/25/2024     07/25/2024    BUN 12 07/25/2024    CREATININE 0.84 07/25/2024               PROCEDURES PERFORMED:    I agree with the history, physical examination, assessment and plan of care as documented above.    Getachew Sandoval M.D.  Attending, Orthopaedic Surgery  Hand, Wrist, and Elbow Surgery  [facility]     "

## 2024-11-25 ENCOUNTER — ANESTHESIA EVENT (OUTPATIENT)
Dept: PERIOP | Facility: HOSPITAL | Age: 39
End: 2024-11-25
Payer: COMMERCIAL

## 2024-11-25 DIAGNOSIS — E66.01 MORBID OBESITY (HCC): Primary | ICD-10-CM

## 2024-11-25 RX ORDER — DIPHENOXYLATE HYDROCHLORIDE AND ATROPINE SULFATE 2.5; .025 MG/1; MG/1
1 TABLET ORAL DAILY
COMMUNITY

## 2024-11-25 NOTE — PRE-PROCEDURE INSTRUCTIONS
Pre-Surgery Instructions:   Medication Instructions    albuterol (PROVENTIL HFA,VENTOLIN HFA) 90 mcg/act inhaler Uses PRN- OK to take day of surgery    cetirizine (ZyrTEC) 10 mg tablet Take day of surgery.    levonorgestrel (MIRENA) 20 MCG/24HR IUD Maintain    linaCLOtide (Linzess) 145 MCG CAPS Hold day of surgery.    multivitamin (THERAGRAN) TABS Stop taking 7 days prior to surgery.    valACYclovir (VALTREX) 500 mg tablet Take day of surgery.    Medication instructions for day surgery reviewed. Please use only a sip of water to take your instructed medications. Avoid all over the counter vitamins, supplements and NSAIDS for one week prior to surgery per anesthesia guidelines. Tylenol is ok to take as needed.     You will receive a call one business day prior to surgery with an arrival time and hospital directions. If your surgery is scheduled on a Monday, the hospital will be calling you on the Friday prior to your surgery. If you have not heard from anyone by 8pm, please call the hospital supervisor through the hospital  at [phone]. ([place] [phone] or [place] [phone]).    Do not eat or drink anything after midnight the night before your surgery, including candy, mints, lifesavers, or chewing gum. Do not drink alcohol 24hrs before your surgery. Try not to smoke at least 24hrs before your surgery.       Follow the pre surgery showering instructions as listed in the “My Surgical Experience Booklet” or otherwise provided by your surgeon's office. Do not use a blade to shave the surgical area 1 week before surgery. It is okay to use a clean electric clippers up to 24 hours before surgery. Do not apply any lotions, creams, including makeup, cologne, deodorant, or perfumes after showering on the day of your surgery. Do not use dry shampoo, hair spray, hair gel, or any type of hair products.     No contact lenses, eye make-up, or artificial eyelashes. Remove nail polish, including gel polish, and  any artificial, gel, or acrylic nails if possible. Remove all jewelry including rings and body piercing jewelry.     Wear causal clothing that is easy to take on and off. Consider your type of surgery.    Keep any valuables, jewelry, piercings at home. Please bring any specially ordered equipment (sling, braces) if indicated.    Arrange for a responsible person to drive you to and from the hospital on the day of your surgery. Please confirm the visitor policy for the day of your procedure when you receive your phone call with an arrival time.     Call the surgeon's office with any new illnesses, exposures, or additional questions prior to surgery.    Please reference your “My Surgical Experience Booklet” for additional information to prepare for your upcoming surgery.

## 2024-11-28 DIAGNOSIS — B00.9 HSV INFECTION: ICD-10-CM

## 2024-11-29 RX ORDER — VALACYCLOVIR HYDROCHLORIDE 500 MG/1
500 TABLET, FILM COATED ORAL DAILY
Qty: 90 TABLET | Refills: 1 | Status: SHIPPED | OUTPATIENT
Start: 2024-11-29 | End: 2025-05-28

## 2024-11-30 DIAGNOSIS — K59.00 CONSTIPATION, UNSPECIFIED CONSTIPATION TYPE: ICD-10-CM

## 2024-12-02 RX ORDER — LINACLOTIDE 145 UG/1
145 CAPSULE, GELATIN COATED ORAL
Qty: 30 CAPSULE | Refills: 2 | Status: SHIPPED | OUTPATIENT
Start: 2024-12-02

## 2024-12-04 DIAGNOSIS — G56.03 BILATERAL CARPAL TUNNEL SYNDROME: ICD-10-CM

## 2024-12-04 DIAGNOSIS — G56.23 CUBITAL TUNNEL SYNDROME OF BOTH UPPER EXTREMITIES: Primary | ICD-10-CM

## 2024-12-04 RX ORDER — ONDANSETRON 4 MG/1
4 TABLET, FILM COATED ORAL EVERY 8 HOURS PRN
Qty: 4 TABLET | Refills: 0 | Status: SHIPPED | OUTPATIENT
Start: 2024-12-04

## 2024-12-04 RX ORDER — SENNOSIDES 8.6 MG
650 CAPSULE ORAL EVERY 8 HOURS
Qty: 30 TABLET | Refills: 0 | Status: SHIPPED | OUTPATIENT
Start: 2024-12-04 | End: 2024-12-14

## 2024-12-04 RX ORDER — NAPROXEN 500 MG/1
500 TABLET ORAL 2 TIMES DAILY WITH MEALS
Qty: 20 TABLET | Refills: 0 | Status: SHIPPED | OUTPATIENT
Start: 2024-12-04 | End: 2024-12-14

## 2024-12-04 NOTE — DISCHARGE INSTR - AVS FIRST PAGE
Getachew Sandoval MD  Post-operative Instructions  -Carpal and Cubital Tunnel Decompression -    Thank you for allowing me to participate in your care. It is a privilege to be able to take care of you. Should you have any questions about your post-operative care feel free to call my office at #[phone]  during business hours and either myself or a member of my team will address any questions or concerns you may have. If this is an urgent matter at night or on weekends please call [phone]  and ask the page  to page the covering 1st call orthopaedic physician.    Prescription refills or changes cannot be addressed after normal business hours or on weekends.    General Post op Instructions  Please allow your body time to heal. No sports, heavy lifting, or vigorous physical activity until you return for follow-up.   Return to your normal diet as soon as you are able.  It is normal to have some swelling and bruising of the hand and fingers post operatively. This can be uncomfortable. Prevent excessive swelling by performing the following:  Elevate your hand above the level of your heart as much as possible during the first 24-48 hours post op.    Move your fingers as much as possible, curling them gently into making a fist and extending them all the way out. Do this as often as you are able (this will not be possible while your nerve block is in effect).   Only wear your sling when you are out of the house or for comfort. You may remove it as soon as you are comfortable.  You may resume all of your home medications. If you are currently undergoing chemotherapy or treatment for a rheumatologic condition please be sure to discuss the continuation of these medications with me prior to leaving the hospital.   Driving:  You may not return to driving while you are taking narcotic pain medication. We strongly advise against return to driving if your procedure was performed on your Right side and your car has a  manual transmission (“stick shift”).   Incision Care and Bathing  You may remove your compressive wrap/ACE bandage after 24 hours. If it feels excessively tight or your fingers are swollen despite elevation and movement you may loosen the compressive wrap sooner.   Please keep your waterproof dressing about the elbow in place until your follow-up appointment.    You may remove the bandage about your wrist 3 days after surgery and cover the incision with a Band-Aid until your follow-up appointment.   No swimming or submerging your wound in water until sutures have been removed and the incision is completely healed.   You may shower 24 hours after surgery. If you choose to shower please tape a waterproof plastic bag around your arm.  After 3 days you may shower normally. The dressing about the elbow is waterproof, the bandage about the wrist, is not.  After the sutures have been removed, gently massage the scar with a moisturizing cream such as Aquaphor two times a day to promote healing and prevent excessive scar formation.   Pain Control  Following the program below will greatly decrease your post-operative pain.   Medications have been sent to your pharmacy.  Aleve (naproxen) 500 mg and Tylenol Arthritis 650 mg on the afternoon/evening of surgery. Do NOT take Aleve if you have a history of gastric ulcers, uncontrolled reflux or have been told previously by a physician that you should not take anti-inflammatory medications such as Advil/Aleve/Motrin.    Aleve (naproxen) 500 mg in the morning and afternoon, for about 2-3 days after the surgery; even if you have no pain.  You can stop two days after surgery if your hand does not hurt.     Tylenol Arthritis (or any brand of acetaminophen 8-hour), 650 mg every eight hours, with a maximum dose of 3000 mg per day, for about 2-3 days after the surgery, even if you have no pain. Tylenol Arthritis plus Aleve work together as a team to make each other stronger.     The  maximum amount of Tylenol is 3000 mg per day, which is the same as 4 of the 650 mg pills. Remember; don't substitute any other medication for the Tylenol: don't take Motrin, aspirin, or any other over the counter medication. It must be Tylenol (or any brand of acetaminophen) for it to work as a team. Remember as well that Tylenol Arthritis is taken every 8 hours, the Aleve is twice a day.   (ondansetron or meclizine) has also been prescribed and is to help control any post-operative nausea you may be experiencing.    You may have had a nerve block performed. This will last 8-24 hours. As the sensation in your hand returns you will begin to experience pain. Please take your pain medication prior to the return of full sensation in order to help control the pain in your arm.     Hand Therapy  After your first postoperative appointment you will begin hand therapy exercises at home. The goal of hand therapy is to prevent stiffness in the fingers, wrist and elbow as well as to promote strength. Your active participation is essential for a full recovery.  Emergencies/When to Call  Please call the office if you have any questions or concerns regarding your post-operative care. We need to know if things are not going well.   Please make sure you call the office or page the 1st call orthopaedic physician immediately if you are having any of the following problems:  Fever greater than 101.0  Increasing pain or numbness not controlled on pain medications  Increasing bloody staining on the dressing   Chest pain, difficulty breathing, nausea or vomiting  Cold fingers that are not normal color (pink)   Any other concerning symptoms

## 2024-12-04 NOTE — OP NOTE
OPERATIVE REPORT  PATIENT NAME: Neha Powers    :  1985  MRN: [id number]  Pt Location: [facility] OR ROOM 03    SURGERY DATE: 2024    Surgeons and Role:     * Getachew Sandoval MD - Primary  Aleena Tapia PA-C Assistant    Preop Diagnosis:  Left carpal tunnel syndrome  Left cubital tunnel syndrome      Postop Diagnosis:  Left carpal tunnel syndrome  Left cubital tunnel syndrome    Procedure(s):  Left carpal tunnel release and left cubital tunnel release    Estimated Blood Loss:   Minimal      Anesthesia Type:   Regional with IV Sedation with Anesthesia    Anesthesia Type:   Regional with Sedation     TOURNIQUET TIME:  31 minutes     IV FLUIDS:  500 mL.       PERIOPERATIVE ANTIBIOTICS:  Cefazolin 2 g.       INDICATIONS FOR SURGERY:   This is a pleasant 39yo female who unfortunately has had persistent discomfort and symptoms consistent with carpal and cubital tunnel syndrome. Despite a course of conservative treatment they continued to have discomfort. Therefore after discussing the risks, benefits, potential gains and complications of surgical intervention the patient signed the informed consent.          PREPARATION:  The patient was identified in the holding area, the left arm was marked.  The patient then underwent a regional blockade by our Anesthesia colleagues for pain relief.  Subsequent to that, they were taken to the operating room in which the arm was scrubbed with chlorhexidine paint.  After waiting 3 minutes, they were draped in the usual sterile fashion.       PROCEDURE IN DETAIL:  A timeout was performed in which the site, side and laterality were confirmed by all members of the team.      Subsequent to that, a curvilinear style incision was marked about the medial aspect of the elbow measuring 6-8 cm in length.  The arm was then elevated and exsanguinated.  The hand was placed down to the table.  Skin was incised with a #15 blade.  Hemostasis was achieved with bipolar electrocautery.   We identified the crossing branches of the medial antebrachial cutaneous nerve. These were carefully protected behind the retractor. Subsequent to that, we took our dissection down to the level of the forearm fascia at the level of the FCU.  The nerve was identified deep to the FCU fascia by identifying the fat stripe, which was opened and then a neurolysis was performed of the ulnar nerve from distal to proximal with a 15 blade and achieving hemostasis with bipolar cautery.  The ulnar nerve was decompressed past the medial epicondyle through the cubital tunnel proper and then proximally past the medial intermuscular septum.  No evidence of instability was present as the elbow was taken through range of motion.  The wounds were then irrigated and closed in a layered fashion with deep 4-0 Monocryl and a running 4-0 Monocryl to the skin.  Exofin glue and steri strips was applied as well as a Tegaderm.           We then turned our attention to the carpal tunnel. The hand was placed on to the table.  We marked out the incision utilizing Mcgill's cardinal lines measuring 2-3 cm in length in line with the radial border of the ring finger, stopping at the proximal wrist crease.  At that point in time, a #15 blade was selected and the skin was incised with a #15 blade.  Hemostasis was achieved with bipolar electrocautery.  The palmar aponeurosis was identified and split longitudinally.  Two sharp Brenda rake retractors were inserted. We carefully exposed the underlying TCL which was incised about its mid aspect. The TCL was divided about its midaspect with a #15 blade.  The dissection carried distally stopping at the level of sentinel fat and our dissection carried proximally stopping at the level of the proximal wrist crease.  At that time, a Littler scissors was selected and with tips closed, a tract was created above and below the transverse carpal ligament.  The remainder of TCL was completely divided under direct  visualization.         The tourniquet was then deflated.  Hemostasis was achieved.  The carpal tunnel wound was closed with 4-0 nylon and the wound was dressed with Adaptic, moist 4 x 4 gauze, and Coban.  The entire arm was then wrapped with a large ACE wrap. The patient was transported to the PACU in stable condition with no untoward immediate complications.           POSTOPERATIVE PLAN:  The patient will return to the clinic in approximately 10-12 days, at which point in time, the suture tails will be cut flush with the skin and she will begin a scar massage protocol.         I was physically present during all critical and key portions of the procedure and immediately available to furnish services during the entire procedure, in compliance with CMS regulations.     A qualified resident physician was not available, and a physician assistant was required during the procedure for retraction, tissue handling, dissection and suturing.         Getachew Sandoval M.D.  Attending, Orthopaedic Surgery  Hand, Wrist, and Elbow Surgery  [facility]

## 2024-12-04 NOTE — H&P
ORTHOPAEDIC HAND, WRIST, AND ELBOW OFFICE  VISIT     ASSESSMENT/PLAN:    Neha Powers is a 38 y.o. RHD female who presents with bilateral carpal tunnel and cubital tunnel syndrome L>R    We discussed that treatment options for carpal and cubital tunnel syndrome include nighttime splinting, injection, therapy and surgery.   The patient has exhausted conservative mgmt for carpal tunnel with recent U/S inconclusive for concomitant cubital syndrome. Clinically patient is significantly symptomatic and reproducible symptoms on clinical exam therefore if going to proceed I do recommend concomitant release of cubital tunnel on more symptomatic side at time of surgery.  The patient was interested in proceeding with left carpal and cubital tunnel surgery.  We reviewed risks of surgery, which include but are not limited to bleeding, infection, wound healing complications, damage to surrounding structures including possible nerve, vessel or tendon damage, need for additional procedures, recurrence, lack of improvement, stiffness, and scarring. No guarantees were made.      She expressed understanding and consented to proceed. We reviewed the expected postoperative course and that we will plan to remove her sutures in 10-14 days postoperatively and she will likely be able to return to work at 2-3 weeks postoperatively. She is happy with the plan.       The patient verbalized understanding of exam findings and treatment plan. We engaged in the shared decision-making process and treatment options were discussed at length with the patient. Surgical and conservative management discussed today along with risks and benefits.    Follow Up:  Post op         Cubital Tunnel Syndrome: The anatomy and physiology of cubital tunnel syndrome were discussed with the patient today in the office.  Typically, increased elbow flexion activities decrease blood flow within the intraneural spaces, resulting in a feeling of numbness, tingling,  weakness, or clumsiness within the hand and fingers.  Occasionally, anatomic structures such as medial elbow osteophytes, the medial head of the triceps, were subluxing ulnar nerve may result in increased pressure or aggravation at the cubital tunnel.  Typical signs and symptoms usually include numbness and tingling within the ring and small finger, weakness with , and weakness with pinch.  Conservative treatment and includes nocturnal bracing to keep the elbow in a semi-extended position, activity modification, therapy, and avoiding excessive elbow flexion activities.  A majority of patients typically respond to conservative treatment over a period of approximately 3-6 months.  EMG/NCV testing of the ulnar nerve at the elbow is not as reliable as carpal tunnel syndrome.  Surgical intervention in the form of in situ release of the ulnar nerve at the elbow or ulnar nerve transposition may be required in up to 20% of patients.       ____________________________________________________________________________________________________________________________________________      CHIEF COMPLAINT:  Bilateral hand pain and numbness    SUBJECTIVE:  Neha Powers is a 38 y.o. female who presents for initial evaluation of bilateral hand numbness and tingling. Patient previously saw Luis Miguel Chirinos PA-C who ordered US for further evaluation. Patient has been experiencing persistent pain and numbness to bilateral hands over the last 5 months. The left side has become increasingly more painful with persistent numbness with no improvement with splinting. She recently had U/S performed for her carpal tunnels, but is also complaining of numbness to the ulnar aspect of her hand on bilateral extremities. She was sent for repeat U/S of elbow and presents today to review results. Denies any trauma. Says the numbness on the left has been affecting her ability to perform her work activities.       Previous Treatments: bracing no   relief  Associated symptoms: Numbness  Handedness: right  Work status: [employer]     I have personally reviewed all the relevant PMH, PSH, SH, FH, Medications and allergies      PAST MEDICAL HISTORY:  Past Medical History:   Diagnosis Date    Allergic rhinitis     Asthma     Body piercing     breast-will insert plastic    Carpal tunnel syndrome     bilateral    HPV (human papilloma virus) infection     IUD (intrauterine device) in place     Tinnitus     minimal    Wears glasses        PAST SURGICAL HISTORY:  Past Surgical History:   Procedure Laterality Date    NO PAST SURGERIES         FAMILY HISTORY:  Family History   Problem Relation Age of Onset    Asthma Maternal Grandmother     Diabetes Paternal Grandmother     Diabetes Paternal Grandfather        SOCIAL HISTORY:  Social History     Tobacco Use    Smoking status: Former     Current packs/day: 0.00     Average packs/day: 0.3 packs/day for 16.0 years (4.0 ttl pk-yrs)     Types: Cigarettes     Start date: 6/15/2002     Quit date: 2018     Years since quittin.5     Passive exposure: Current    Smokeless tobacco: Current    Tobacco comments:     Medical marijuana card-   Vaping Use    Vaping status: Never Used   Substance Use Topics    Alcohol use: Yes     Comment: special occasions    Drug use: Yes     Types: Marijuana     Comment: Medical Card        MEDICATIONS:  No current facility-administered medications for this encounter.    Current Outpatient Medications:     albuterol (PROVENTIL HFA,VENTOLIN HFA) 90 mcg/act inhaler, INHALE 2 PUFFS BY MOUTH EVERY 4 HOURS AS NEEDED FOR WHEEZE, Disp: 18 g, Rfl: 1    cetirizine (ZyrTEC) 10 mg tablet, Take 10 mg by mouth daily, Disp: , Rfl:     levonorgestrel (MIRENA) 20 MCG/24HR IUD, 1 each by Intrauterine route once, Disp: , Rfl:     multivitamin (THERAGRAN) TABS, Take 1 tablet by mouth daily, Disp: , Rfl:     Linzess 145 MCG CAPS, TAKE 1 CAPSULE (145 MCG TOTAL) BY MOUTH DAILY IN THE EARLY MORNING, Disp:  "30 capsule, Rfl: 2    valACYclovir (VALTREX) 500 mg tablet, Take 1 tablet (500 mg total) by mouth daily, Disp: 90 tablet, Rfl: 1    ALLERGIES:  No Known Allergies        REVIEW OF SYSTEMS:  Pertinent items are noted in HPI.  A comprehensive review of systems was negative.    VITALS:  There were no vitals filed for this visit.      LABS:  HgA1c: No results found for: \"HGBA1C\"  BMP:   Lab Results   Component Value Date    K 4.3 07/25/2024    CO2 23 07/25/2024     07/25/2024    BUN 12 07/25/2024    CREATININE 0.84 07/25/2024       _____________________________________________________  PHYSICAL EXAMINATION:  General: well developed and well nourished, alert, oriented times 3, and appears comfortable  Psychiatric: Normal  HEENT: Normocephalic, Atraumatic Trachea Midline, No torticollis  Pulmonary: No audible wheezing or respiratory distress   Abdomen/GI: Non tender, non distended   Cardiovascular: Regular Rate and Rhythm. No pitting edema, 2+ radial pulse   Skin: No masses, erythema, lacerations, fluctation, ulcerations  Neurovascular: Sensation Intact to the Median, Ulnar, Radial Nerve, Motor Intact to the Median, Ulnar, Radial Nerve, and Pulses Intact  Musculoskeletal: Normal, except as noted in detailed exam and in HPI.        FOCUSED MUSCULOSKELETAL EXAMINATION:  Right Upper Extremity  Inspection: skin intact, no notable deformity   Palpation:  NTTP  Neurologic:  5/5 elbow flexion, 5/5 elbow extension, 5/5 wrist extension, 5/5 wrist flexion, 5/5 finger flexion, 5/5 finger extension, 5/5 FPL, 5/5 EPL, 5/5 APB, 5/5 intrinsics, sensation intact to median, radial, and ulnar nerve distributions  Vascular: Palpable radial pulse, brisk cap refill <2sec, hand warm and well perfused  MSK:   RIGHT SIDE:  Carpal tunnel:  Weakness APB, Postive Tinel's, and Positive Derkin's Compression Test  Positive elbow flexion compression test    Left Upper Extremity  Inspection: skin intact, no notable deformity   Palpation:  " "NTTP  Neurologic:  5/5 elbow flexion, 5/5 elbow extension, 5/5 wrist extension, 5/5 wrist flexion, 5/5 finger flexion, 5/5 finger extension, 5/5 FPL, 5/5 EPL, 5/5 APB, 5/5 intrinsics, sensation intact to median, radial, and ulnar nerve distributions  Vascular: Palpable radial pulse, brisk cap refill <2sec, hand warm and well perfused  MSK:   LEFT SIDE:  Carpal tunnel:  Weakness APB, Postive Tinel's, and Positive Derkin's Compression Test  Positive elbow flexion compression test  ___________________________________________________  STUDIES REVIEWED:  I have personally reviewed and interpreted  US of bilateral wrist obtained on 8/19/24 which demonstrates findings suspicious for carpal tunnel based on presence of hypervascularity within the median nerve      U/S of bilateral elbow were independently reviewed which demonstrates both right and left ulnar nerve edema with increased cross sectional area indicative of possible cubital tunnel syndrome and ulnar neuropathy.       LABS REVIEWED:    HgA1c: No results found for: \"HGBA1C\"  BMP:   Lab Results   Component Value Date    K 4.3 07/25/2024    CO2 23 07/25/2024     07/25/2024    BUN 12 07/25/2024    CREATININE 0.84 07/25/2024               PROCEDURES PERFORMED:    I agree with the history, physical examination, assessment and plan of care as documented above.    Getachew Sandoval M.D.  Attending, Orthopaedic Surgery  Hand, Wrist, and Elbow Surgery  [facility]     "

## 2024-12-05 ENCOUNTER — ANESTHESIA (OUTPATIENT)
Dept: PERIOP | Facility: HOSPITAL | Age: 39
End: 2024-12-05
Payer: COMMERCIAL

## 2024-12-05 ENCOUNTER — HOSPITAL ENCOUNTER (OUTPATIENT)
Facility: HOSPITAL | Age: 39
Setting detail: OUTPATIENT SURGERY
Discharge: HOME | End: 2024-12-05
Attending: STUDENT IN AN ORGANIZED HEALTH CARE EDUCATION/TRAINING PROGRAM | Admitting: STUDENT IN AN ORGANIZED HEALTH CARE EDUCATION/TRAINING PROGRAM
Payer: COMMERCIAL

## 2024-12-05 VITALS
SYSTOLIC BLOOD PRESSURE: 140 MMHG | DIASTOLIC BLOOD PRESSURE: 85 MMHG | OXYGEN SATURATION: 95 % | HEART RATE: 57 BPM | RESPIRATION RATE: 18 BRPM | WEIGHT: 251.77 LBS | BODY MASS INDEX: 42.55 KG/M2 | TEMPERATURE: 97.2 F

## 2024-12-05 LAB
EXT PREGNANCY TEST URINE: NEGATIVE
EXT. CONTROL: NORMAL

## 2024-12-05 PROCEDURE — 64721 CARPAL TUNNEL SURGERY: CPT | Performed by: PHYSICIAN ASSISTANT

## 2024-12-05 PROCEDURE — 64718 REVISE ULNAR NERVE AT ELBOW: CPT | Performed by: STUDENT IN AN ORGANIZED HEALTH CARE EDUCATION/TRAINING PROGRAM

## 2024-12-05 PROCEDURE — 81025 URINE PREGNANCY TEST: CPT | Performed by: STUDENT IN AN ORGANIZED HEALTH CARE EDUCATION/TRAINING PROGRAM

## 2024-12-05 PROCEDURE — 64721 CARPAL TUNNEL SURGERY: CPT | Performed by: STUDENT IN AN ORGANIZED HEALTH CARE EDUCATION/TRAINING PROGRAM

## 2024-12-05 PROCEDURE — 64718 REVISE ULNAR NERVE AT ELBOW: CPT | Performed by: PHYSICIAN ASSISTANT

## 2024-12-05 RX ORDER — CEFAZOLIN SODIUM 2 G/50ML
2000 SOLUTION INTRAVENOUS ONCE
Status: COMPLETED | OUTPATIENT
Start: 2024-12-05 | End: 2024-12-05

## 2024-12-05 RX ORDER — PROPOFOL 10 MG/ML
INJECTION, EMULSION INTRAVENOUS CONTINUOUS PRN
Status: DISCONTINUED | OUTPATIENT
Start: 2024-12-05 | End: 2024-12-05

## 2024-12-05 RX ORDER — PROPOFOL 10 MG/ML
INJECTION, EMULSION INTRAVENOUS AS NEEDED
Status: DISCONTINUED | OUTPATIENT
Start: 2024-12-05 | End: 2024-12-05

## 2024-12-05 RX ORDER — ONDANSETRON 2 MG/ML
4 INJECTION INTRAMUSCULAR; INTRAVENOUS ONCE AS NEEDED
Status: DISCONTINUED | OUTPATIENT
Start: 2024-12-05 | End: 2024-12-05 | Stop reason: HOSPADM

## 2024-12-05 RX ORDER — ROPIVACAINE HYDROCHLORIDE 5 MG/ML
INJECTION, SOLUTION EPIDURAL; INFILTRATION; PERINEURAL
Status: COMPLETED | OUTPATIENT
Start: 2024-12-05 | End: 2024-12-05

## 2024-12-05 RX ORDER — SODIUM CHLORIDE, SODIUM LACTATE, POTASSIUM CHLORIDE, CALCIUM CHLORIDE 600; 310; 30; 20 MG/100ML; MG/100ML; MG/100ML; MG/100ML
INJECTION, SOLUTION INTRAVENOUS CONTINUOUS PRN
Status: DISCONTINUED | OUTPATIENT
Start: 2024-12-05 | End: 2024-12-05

## 2024-12-05 RX ORDER — FENTANYL CITRATE 50 UG/ML
INJECTION, SOLUTION INTRAMUSCULAR; INTRAVENOUS AS NEEDED
Status: DISCONTINUED | OUTPATIENT
Start: 2024-12-05 | End: 2024-12-05

## 2024-12-05 RX ORDER — FENTANYL CITRATE/PF 50 MCG/ML
25 SYRINGE (ML) INJECTION
Status: COMPLETED | OUTPATIENT
Start: 2024-12-05 | End: 2024-12-05

## 2024-12-05 RX ORDER — DIPHENHYDRAMINE HYDROCHLORIDE 50 MG/ML
12.5 INJECTION INTRAMUSCULAR; INTRAVENOUS ONCE AS NEEDED
Status: DISCONTINUED | OUTPATIENT
Start: 2024-12-05 | End: 2024-12-05 | Stop reason: HOSPADM

## 2024-12-05 RX ORDER — MIDAZOLAM HYDROCHLORIDE 2 MG/2ML
INJECTION, SOLUTION INTRAMUSCULAR; INTRAVENOUS AS NEEDED
Status: DISCONTINUED | OUTPATIENT
Start: 2024-12-05 | End: 2024-12-05

## 2024-12-05 RX ORDER — HYDROMORPHONE HCL/PF 1 MG/ML
0.5 SYRINGE (ML) INJECTION
Status: DISCONTINUED | OUTPATIENT
Start: 2024-12-05 | End: 2024-12-05 | Stop reason: HOSPADM

## 2024-12-05 RX ORDER — FENTANYL CITRATE/PF 50 MCG/ML
50 SYRINGE (ML) INJECTION
Status: DISCONTINUED | OUTPATIENT
Start: 2024-12-05 | End: 2024-12-05 | Stop reason: HOSPADM

## 2024-12-05 RX ORDER — DEXAMETHASONE SODIUM PHOSPHATE 10 MG/ML
INJECTION, SOLUTION INTRAMUSCULAR; INTRAVENOUS AS NEEDED
Status: DISCONTINUED | OUTPATIENT
Start: 2024-12-05 | End: 2024-12-05

## 2024-12-05 RX ORDER — LIDOCAINE HYDROCHLORIDE 10 MG/ML
INJECTION, SOLUTION EPIDURAL; INFILTRATION; INTRACAUDAL; PERINEURAL AS NEEDED
Status: DISCONTINUED | OUTPATIENT
Start: 2024-12-05 | End: 2024-12-05 | Stop reason: HOSPADM

## 2024-12-05 RX ORDER — CHLORHEXIDINE GLUCONATE ORAL RINSE 1.2 MG/ML
15 SOLUTION DENTAL ONCE
Status: COMPLETED | OUTPATIENT
Start: 2024-12-05 | End: 2024-12-05

## 2024-12-05 RX ORDER — OXYCODONE AND ACETAMINOPHEN 5; 325 MG/1; MG/1
1 TABLET ORAL ONCE AS NEEDED
Status: COMPLETED | OUTPATIENT
Start: 2024-12-05 | End: 2024-12-05

## 2024-12-05 RX ORDER — SODIUM CHLORIDE, SODIUM LACTATE, POTASSIUM CHLORIDE, CALCIUM CHLORIDE 600; 310; 30; 20 MG/100ML; MG/100ML; MG/100ML; MG/100ML
125 INJECTION, SOLUTION INTRAVENOUS CONTINUOUS
Status: DISCONTINUED | OUTPATIENT
Start: 2024-12-05 | End: 2024-12-05 | Stop reason: HOSPADM

## 2024-12-05 RX ORDER — LIDOCAINE HYDROCHLORIDE 10 MG/ML
INJECTION, SOLUTION EPIDURAL; INFILTRATION; INTRACAUDAL; PERINEURAL AS NEEDED
Status: DISCONTINUED | OUTPATIENT
Start: 2024-12-05 | End: 2024-12-05

## 2024-12-05 RX ORDER — MAGNESIUM HYDROXIDE 1200 MG/15ML
LIQUID ORAL AS NEEDED
Status: DISCONTINUED | OUTPATIENT
Start: 2024-12-05 | End: 2024-12-05 | Stop reason: HOSPADM

## 2024-12-05 RX ORDER — ONDANSETRON 2 MG/ML
INJECTION INTRAMUSCULAR; INTRAVENOUS AS NEEDED
Status: DISCONTINUED | OUTPATIENT
Start: 2024-12-05 | End: 2024-12-05

## 2024-12-05 RX ORDER — SODIUM CHLORIDE, SODIUM LACTATE, POTASSIUM CHLORIDE, CALCIUM CHLORIDE 600; 310; 30; 20 MG/100ML; MG/100ML; MG/100ML; MG/100ML
20 INJECTION, SOLUTION INTRAVENOUS CONTINUOUS
Status: CANCELLED | OUTPATIENT
Start: 2024-12-05

## 2024-12-05 RX ADMIN — DEXAMETHASONE SODIUM PHOSPHATE 10 MG: 10 INJECTION, SOLUTION INTRAMUSCULAR; INTRAVENOUS at 12:29

## 2024-12-05 RX ADMIN — FENTANYL CITRATE 25 MCG: 50 INJECTION INTRAMUSCULAR; INTRAVENOUS at 13:50

## 2024-12-05 RX ADMIN — LIDOCAINE HYDROCHLORIDE 50 MG: 10 INJECTION, SOLUTION EPIDURAL; INFILTRATION; INTRACAUDAL; PERINEURAL at 12:26

## 2024-12-05 RX ADMIN — HYDROMORPHONE HYDROCHLORIDE 0.5 MG: 1 INJECTION, SOLUTION INTRAMUSCULAR; INTRAVENOUS; SUBCUTANEOUS at 14:08

## 2024-12-05 RX ADMIN — FENTANYL CITRATE 25 MCG: 50 INJECTION INTRAMUSCULAR; INTRAVENOUS at 13:46

## 2024-12-05 RX ADMIN — FENTANYL CITRATE 25 MCG: 50 INJECTION INTRAMUSCULAR; INTRAVENOUS at 13:32

## 2024-12-05 RX ADMIN — FENTANYL CITRATE 25 MCG: 50 INJECTION INTRAMUSCULAR; INTRAVENOUS at 13:21

## 2024-12-05 RX ADMIN — ONDANSETRON 4 MG: 2 INJECTION INTRAMUSCULAR; INTRAVENOUS at 12:29

## 2024-12-05 RX ADMIN — SODIUM CHLORIDE, SODIUM LACTATE, POTASSIUM CHLORIDE, AND CALCIUM CHLORIDE: .6; .31; .03; .02 INJECTION, SOLUTION INTRAVENOUS at 12:24

## 2024-12-05 RX ADMIN — FENTANYL CITRATE 50 MCG: 50 INJECTION, SOLUTION INTRAMUSCULAR; INTRAVENOUS at 12:15

## 2024-12-05 RX ADMIN — CEFAZOLIN SODIUM 2000 MG: 2 SOLUTION INTRAVENOUS at 12:24

## 2024-12-05 RX ADMIN — FENTANYL CITRATE 50 MCG: 50 INJECTION, SOLUTION INTRAMUSCULAR; INTRAVENOUS at 12:36

## 2024-12-05 RX ADMIN — MIDAZOLAM 2 MG: 1 INJECTION INTRAMUSCULAR; INTRAVENOUS at 12:15

## 2024-12-05 RX ADMIN — ROPIVACAINE HYDROCHLORIDE 20 ML: 5 INJECTION, SOLUTION EPIDURAL; INFILTRATION; PERINEURAL at 12:15

## 2024-12-05 RX ADMIN — CHLORHEXIDINE GLUCONATE 15 ML: 1.2 RINSE ORAL at 10:33

## 2024-12-05 RX ADMIN — PROPOFOL 120 MCG/KG/MIN: 10 INJECTION, EMULSION INTRAVENOUS at 12:26

## 2024-12-05 RX ADMIN — PROPOFOL 50 MG: 10 INJECTION, EMULSION INTRAVENOUS at 12:26

## 2024-12-05 RX ADMIN — OXYCODONE HYDROCHLORIDE AND ACETAMINOPHEN 1 TABLET: 5; 325 TABLET ORAL at 14:09

## 2024-12-05 NOTE — ANESTHESIA PROCEDURE NOTES
Peripheral Block    Patient location during procedure: holding area  Start time: 12/5/2024 12:15 PM  Reason for block: at surgeon's request and post-op pain management  Staffing  Performed by: Manda Salas MD  Authorized by: Manda Salas MD    Preanesthetic Checklist  Completed: patient identified, IV checked, site marked, risks and benefits discussed, surgical consent, monitors and equipment checked, pre-op evaluation and timeout performed  Peripheral Block  Patient position: sitting  Prep: ChloraPrep  Patient monitoring: frequent blood pressure checks, continuous pulse oximetry and heart rate  Block type: Supraclavicular  Laterality: left  Injection technique: single-shot  Procedures: ultrasound guided, Ultrasound guidance required for the procedure to increase accuracy and safety of medication placement and decrease risk of complications.  Ultrasound permanent image saved  ropivacaine (NAROPIN) 0.5 % injection 20 mL - Perineural   20 mL - 12/5/2024 12:15:00 PM  Needle  Needle type: Stimuplex   Needle gauge: 20 G  Needle length: 4 in  Needle localization: anatomical landmarks and ultrasound guidance  Assessment  Injection assessment: incremental injection, frequent aspiration, injected with ease, negative aspiration, negative for heart rate change, no paresthesia on injection, no symptoms of intraneural/intravenous injection and needle tip visualized at all times  Paresthesia pain: none  Post-procedure:  site cleaned  patient tolerated the procedure well with no immediate complications

## 2024-12-05 NOTE — INTERVAL H&P NOTE
H&P reviewed. After examining the patient I find no changes in the patients condition since the H&P had been written.    Vitals:    12/05/24 1029   BP: 137/77   Pulse: 71   Resp: 16   Temp: 98.1 °F (36.7 °C)   SpO2: 95%

## 2024-12-05 NOTE — PERIOPERATIVE NURSING NOTE
AVS reviewed with patient and spouse. All questions answered and all concerns addressed. Patient discharged via wheelchair with all belongings.

## 2024-12-05 NOTE — ANESTHESIA POSTPROCEDURE EVALUATION
Post-Op Assessment Note    CV Status:  Stable  Pain Score: 4    Pain management: adequate       Mental Status:  Alert and awake   Hydration Status:  Euvolemic   PONV Controlled:  Controlled   Airway Patency:  Patent     Post Op Vitals Reviewed: Yes    No anethesia notable event occurred.    Staff: Anesthesiologist, CRNA           Last Filed PACU Vitals:  Vitals Value Taken Time   Temp 97.2 °F (36.2 °C) 12/05/24 1310   Pulse 53 12/05/24 1359   /79 12/05/24 1359   Resp 13 12/05/24 1359   SpO2 95 % 12/05/24 1359       Modified Corina:  Activity: 2 (12/5/2024  1:59 PM)  Respiration: 2 (12/5/2024  1:59 PM)  Circulation: 2 (12/5/2024  1:59 PM)  Consciousness: 2 (12/5/2024  1:59 PM)  Oxygen Saturation: 2 (12/5/2024  1:59 PM)  Modified Corina Score: 10 (12/5/2024  1:59 PM)

## 2024-12-05 NOTE — ANESTHESIA PREPROCEDURE EVALUATION
Procedure:  LEFT CARPAL TUNNEL RELEASE, LEFT CUBITAL TUNNEL RELEASE WITH POSSIBLE ANTERIOR TRANSPOSITION (Left: Elbow)    Relevant Problems   PULMONARY   (+) Asthma      Orthopedic/Musculoskeletal   (+) Bilateral carpal tunnel syndrome      Former smoker  BMI 41  Physical Exam    Airway    Mallampati score: II  TM Distance: >3 FB  Neck ROM: full     Dental   Comment: Denies loose teeth     Cardiovascular  Cardiovascular exam normal    Pulmonary  Pulmonary exam normal     Other Findings  Portions of exam deferred due to low yield and/or unknown COVID statuspost-pubertal.      Anesthesia Plan  ASA Score- 3     Anesthesia Type- regional with ASA Monitors.         Additional Monitors:     Airway Plan:            Plan Factors-Exercise tolerance (METS): >4 METS.    Chart reviewed.   Existing labs reviewed. Patient summary reviewed.    Patient is not a current smoker.              Induction- intravenous.    Postoperative Plan-         Informed Consent- Anesthetic plan and risks discussed with patient.  I personally reviewed this patient with the CRNA. Discussed and agreed on the Anesthesia Plan with the CRNA..

## 2024-12-05 NOTE — PERIOPERATIVE NURSING NOTE
OT IRP Treatment    Primary Rehabilitation Diagnosis: central cord syndrome        Planned Discharge Destination: Home     SUBJECTIVE: Subjective: \"I wish this thing (R  hand) would work\" (02/14/22 1000)       Patient's Personal Goal: \"To get back using my hands again.\"     OBJECTIVE:  Precautions  Neck Brace: At all times (02/14/22 1000)  Cervical Precautions: Yes (02/14/22 1000)  Lumbar Precautions: Yes (02/14/22 1000)  Seizure Precautions: Yes (02/14/22 1000)  Other Precautions: N95 mask and face shield worn per infection prevention protocol (02/14/22 1000)  Precautions Comments: N95 mask and face shield donned during session per infection prevention protocol (02/11/22 1000)    Pain Intensity  Numeric Rating Scale 0-10: 4, 7 at time of session    See below for current functional status overview.  See OT flowsheet for full details regarding the OT therapy provided.      ASSESSMENT:    Treatment today focused on facilitating RUE, functional movements and pain management.  Patient is demonstrating fair progress today due to increased pain level.    Patient limited at this time by pain and edema in R.  Patient will benefit from further skilled OT for continued training with ADL task completion to help the patient meet goal of min A.     OT Identified Barriers to Discharge: pain, BUE weakness, fluctuating orthostatic, BLE strength     This patient participated in all scheduled occupational therapy time with this therapist today.     Education:   No new education at this time    LONG TERM GOALS:    Feeding Discharge Goal 1: Supervision for safety / efficacy with AE PRN  Grooming Discharge Goal 1: Supervision for safety/efficacy in supported positioning; AE PRN  Bathing Discharge Goal 1: Min A - bed level PRN for safety  Dressing Discharge Goal 1: Pullover shirt - SBA for efficacy/safety  Dressing Discharge Goal 2: LB dressing at bed level / supported positioning at min A for efficacy  Toileting Discharge Goal 1: Min A  Patient received from PACU, awake,alert and oriented. Surgical dressing in place, sling in place. Patient given po fluids, tolerating well.    for efficacy/safety  Home Setting Transfer Discharge Goal 1: Pt will perform SPT min A with LRAD for safety     PLAN:   Continue skilled OT, including the following Treatment Interventions: ADL retraining;Functional transfer training;UE strengthening/ROM;Endurance training;Patient/Family training;Neuro muscular reeducation;Fine motor coordination activities;Compensatory technique education (01/31/22 0745)   OT Frequency: 5 days/week (01/31/22 0745), Frequency Comments: 45-90 minutes (01/31/22 0745)                  RECOMMENDATIONS FOR DISCHARGE:  Recommendations for Discharge: OT IL: Patient requires 24 HOUR assistance to perform mobility and/or ADLs safely, Patient is appropriate for Occupational Therapy 1-3 times per week     PT/OT Mobility Equipment for Discharge: tbd (02/09/22 0830)   PT/OT ADL Equipment for Discharge: TBD; anticipate TTB, BSC (02/09/22 1300)       FUNCTIONAL DATA OVERVIEW LAST 24 HOURS  ADLs         Household mobility        Home Management        Tolerance  OT Activity Tolerance  Activity Tolerance: 1:1 Activity to rest (02/14/22 1000)     Cognition        Vision  @FLOW ([id number])     Interventions          Interventions Comments: Significant retrograde massage completed to R wrist/hand with lotion to reduce edema to allow for functional movement of the wrist/hand, pt able to increase flex of fingers to touch objects placed in hand, able to wrap L fingers around smaller cones and lift and stack with full hand, thumb and IF with R with assist for hand placement (02/14/22 1000)     At the end of the therapy session, patient is in wheelchair with the following safety measures in place: alarm system in place/re-engaged.    Patient is located in the rehab gym and was handed off to Rehab Aide/Tech. Patient's family was not present. .

## 2024-12-11 ENCOUNTER — TELEPHONE (OUTPATIENT)
Age: 39
End: 2024-12-11

## 2024-12-11 NOTE — TELEPHONE ENCOUNTER
Caller: Patient    Doctor: Dr. Sandoval    Reason for call: Patient asking if a note can be written excusing her from work until the f/u on 12/20/24.  Patient had surgery on 12/5/24.  Patient asking for call back when note is completed and she will stop by office to pick the note up.      Call back#:

## 2024-12-13 ENCOUNTER — RA CDI HCC (OUTPATIENT)
Dept: OTHER | Facility: HOSPITAL | Age: 39
End: 2024-12-13

## 2024-12-13 NOTE — PROGRESS NOTES
HCC coding opportunities          Chart Reviewed number of suggestions sent to Provider: 1  E66.01     Patients Insurance        Commercial Insurance: [employer]

## 2024-12-20 ENCOUNTER — OFFICE VISIT (OUTPATIENT)
Dept: OBGYN CLINIC | Facility: CLINIC | Age: 39
End: 2024-12-20

## 2024-12-20 VITALS
HEART RATE: 74 BPM | SYSTOLIC BLOOD PRESSURE: 126 MMHG | HEIGHT: 64 IN | BODY MASS INDEX: 42.68 KG/M2 | DIASTOLIC BLOOD PRESSURE: 90 MMHG | WEIGHT: 250 LBS

## 2024-12-20 DIAGNOSIS — G56.03 BILATERAL CARPAL TUNNEL SYNDROME: Primary | ICD-10-CM

## 2024-12-20 DIAGNOSIS — G56.23 CUBITAL TUNNEL SYNDROME OF BOTH UPPER EXTREMITIES: ICD-10-CM

## 2024-12-20 PROCEDURE — 99024 POSTOP FOLLOW-UP VISIT: CPT | Performed by: STUDENT IN AN ORGANIZED HEALTH CARE EDUCATION/TRAINING PROGRAM

## 2024-12-20 NOTE — PROGRESS NOTES
Assessment/Plan:  Patient ID: Neha Powers 39 y.o. female   Diagnosis:  Bilateral carpal tunnel syndrome L>R  Bilateral cubital tunnel syndrome L>R  Surgery: Left Carpal Tunnel Release, Left Cubital Tunnel Release - Left  Date of Surgery: 12/5/2024        - Sutures were removed without complication. Patient educated to begin scar massage and regular daily digital ROM.       - Patient will follow up 4 weeks for repeat discussion regarding timeline for contralateral extremity.        CHIEF COMPLAINT:  Postop         SUBJECTIVE:  Neha Powers is a 39 y.o. year old female who presents for follow up after Left Carpal Tunnel Release, Left Cubital Tunnel Release - Left. Today patient is doing well. Pain well controlled.       PHYSICAL EXAMINATION:  General: well developed and well nourished, alert, oriented times 3, and appears comfortable  Psychiatric: Normal    MUSCULOSKELETAL EXAMINATION:  Right Upper Extremity  Inspection: skin intact, no notable deformity   Palpation:  NTTP  Neurologic:  5/5 elbow flexion, 5/5 elbow extension, 5/5 wrist extension, 5/5 wrist flexion, 5/5 finger flexion, 5/5 finger extension, 5/5 FPL, 5/5 EPL, 5/5 APB, 5/5 intrinsics, sensation intact to median, radial, and ulnar nerve distributions  Vascular: Palpable radial pulse, brisk cap refill <2sec, hand warm and well perfused  MSK:   RIGHT SIDE:  Carpal tunnel:  Weakness APB, Postive Tinel's, and Positive Derkin's Compression Test  Positive elbow flexion compression test    Left Upper Extremity  Inspection: incision clean, dry and intact. No surrounding erythema.   Palpation:  Appropriate sindhu-incisional tenderness to palpation  Neurologic:  5/5 elbow flexion, 5/5 elbow extension, 5/5 wrist extension, 5/5 wrist flexion, 5/5 finger flexion, 5/5 finger extension, 5/5 FPL, 5/5 EPL, 5/5 APB, 5/5 intrinsics, sensation intact to median, radial, and ulnar nerve distributions  Vascular: Palpable radial pulse, brisk cap refill <2sec, hand warm and  well perfused  MSK:  ROM/strength limited by surgery/immobilization, no tinel at wrist or elbow        STUDIES REVIEWED:  No Studies to review      PROCEDURES PERFORMED:  Procedures  No Procedures performed today      I agree with the history, physical examination, assessment and plan of care as documented above.    Getachew Sandoval M.D.  Attending, Orthopaedic Surgery  Hand, Wrist, and Elbow Surgery  [facility]

## 2025-02-02 DIAGNOSIS — J20.9 ACUTE BRONCHITIS, UNSPECIFIED ORGANISM: ICD-10-CM

## 2025-02-04 ENCOUNTER — OFFICE VISIT (OUTPATIENT)
Age: 40
End: 2025-02-04
Payer: COMMERCIAL

## 2025-02-04 VITALS
DIASTOLIC BLOOD PRESSURE: 103 MMHG | HEIGHT: 65 IN | HEART RATE: 79 BPM | BODY MASS INDEX: 43.62 KG/M2 | SYSTOLIC BLOOD PRESSURE: 140 MMHG | WEIGHT: 261.8 LBS

## 2025-02-04 DIAGNOSIS — K58.1 IRRITABLE BOWEL SYNDROME WITH CONSTIPATION: Primary | ICD-10-CM

## 2025-02-04 DIAGNOSIS — K21.9 GASTROESOPHAGEAL REFLUX DISEASE, UNSPECIFIED WHETHER ESOPHAGITIS PRESENT: ICD-10-CM

## 2025-02-04 DIAGNOSIS — R10.13 EPIGASTRIC PAIN: ICD-10-CM

## 2025-02-04 DIAGNOSIS — K59.00 CONSTIPATION, UNSPECIFIED CONSTIPATION TYPE: ICD-10-CM

## 2025-02-04 PROCEDURE — 99214 OFFICE O/P EST MOD 30 MIN: CPT | Performed by: PHYSICIAN ASSISTANT

## 2025-02-04 RX ORDER — OMEPRAZOLE 40 MG/1
40 CAPSULE, DELAYED RELEASE ORAL DAILY
Qty: 30 CAPSULE | Refills: 1 | Status: SHIPPED | OUTPATIENT
Start: 2025-02-04

## 2025-02-04 RX ORDER — POLYETHYLENE GLYCOL-3350 AND ELECTROLYTES 236; 6.74; 5.86; 2.97; 22.74 G/274.31G; G/274.31G; G/274.31G; G/274.31G; G/274.31G
4 POWDER, FOR SOLUTION ORAL ONCE
Qty: 4000 ML | Refills: 0 | Status: SHIPPED | OUTPATIENT
Start: 2025-02-04 | End: 2025-02-04

## 2025-02-04 RX ORDER — SODIUM CHLORIDE, SODIUM LACTATE, POTASSIUM CHLORIDE, CALCIUM CHLORIDE 600; 310; 30; 20 MG/100ML; MG/100ML; MG/100ML; MG/100ML
125 INJECTION, SOLUTION INTRAVENOUS CONTINUOUS
OUTPATIENT
Start: 2025-02-04

## 2025-02-04 RX ORDER — LINACLOTIDE 145 UG/1
145 CAPSULE, GELATIN COATED ORAL
Qty: 90 CAPSULE | Refills: 3 | Status: SHIPPED | OUTPATIENT
Start: 2025-02-04

## 2025-02-04 NOTE — PROGRESS NOTES
Name: Neha Powers      : 1985      MRN: [id number]  Encounter Provider: Queenie Kaur PA-C  Encounter Date: 2025   Encounter department: [facility]  :  Assessment & Plan  Irritable bowel syndrome with constipation  -diagnosis of exclusion, suspected but has never had a colonoscopy  -continue with Linzess 145 mcg as she is doing very well with this dose in addition to fiber gummies daily   -high fiber diet and good hydration  -as she has never had a colonoscopy and has had persistent constipation symptoms requiring increasing dose of Linzess, would recommend colonoscopy at this time to rule out any polyps or colorectal lesions  -discussed procedure, risks including perforation, infection, and bleeding, missing a diagnosis, and benefits in detail with patient   -dulcolax and golytley prep.   -no med holds    Orders:    omeprazole (PriLOSEC) 40 MG capsule; Take 1 capsule (40 mg total) by mouth daily    Colonoscopy; Future    polyethylene glycol (GaviLyte-G) 4000 mL solution; Take 4,000 mL by mouth once for 1 dose Per office instructions    Gastroesophageal reflux disease, unspecified whether esophagitis present  -has been having issues off and on with regurgitation while brushing teeth, reflux, epigastric burning. Also admits to using advil every other day for headaches. Rule out esophagitis, gastritis, H pylori, PUD  -start omeprazole 40 mg daily  -try to reduce NSAID use  -anti-reflux diet and measures  -plan for EGD at same time as colonoscopy  Orders:    omeprazole (PriLOSEC) 40 MG capsule; Take 1 capsule (40 mg total) by mouth daily    EGD; Future    Epigastric pain  -omeprazole and EGD as above   Orders:    omeprazole (PriLOSEC) 40 MG capsule; Take 1 capsule (40 mg total) by mouth daily    EGD; Future        History of Present Illness   HPI  Neha Powers is a 39 y.o. female with a history of asthma who presents for a follow-up for constipation.  She was  last seen in 2023 after having a few months of change in bowel habits with abdominal cramping, bloating, and constipation.  She had some improvement with Metamucil and was also started on a low-dose of Linzess.  She contacted our office in August 2024 because she felt that she was not having as much benefit with the lower dose of Linzess 72 mcg and was increased to 145 mcg daily at that time. She reports that with this increased dose and addition of fiber gummies daily, she is doing very well with her bowel habits now. Denies any rectal bleeding. Bloating is improved.    She reports that she has noticed a worsening of epigastric burning, reflux at times, especially pending what she eats. She has a strong gag reflex and sometimes while brushing her teeth with regurgitate food from the day before. Reports advil use every other day for headaches. She has not taken omeprazole in a long time. Denies dysphagia, vomiting, has occasional nausea.     She has never had an EGD or colonoscopy in the past. No family history of colon cancer but her family has a lot of GI issues.       Review of Systems   Constitutional:  Negative for activity change, appetite change, fever and unexpected weight change.   HENT:  Negative for drooling, mouth sores, sore throat, trouble swallowing and voice change.    Eyes:  Negative for pain, discharge and visual disturbance.   Respiratory:  Negative for cough, choking, chest tightness and shortness of breath.    Cardiovascular:  Negative for chest pain, palpitations and leg swelling.   Gastrointestinal:  Positive for abdominal pain, constipation and nausea. Negative for abdominal distention, anal bleeding, blood in stool, diarrhea, rectal pain and vomiting.   Genitourinary:  Negative for decreased urine volume, difficulty urinating, dysuria, flank pain and urgency.   Musculoskeletal:  Negative for arthralgias, back pain, gait problem, myalgias and neck stiffness.   Skin:  Negative for color  "change, pallor and rash.   Neurological:  Negative for dizziness, syncope and light-headedness.   Hematological:  Negative for adenopathy.   Psychiatric/Behavioral:  Negative for confusion. The patient is not nervous/anxious.           Objective   BP (!) 140/103 (Patient Position: Standing, Cuff Size: Standard)   Pulse 79   Ht 5' 5\" (1.651 m)   Wt 119 kg (261 lb 12.8 oz)   BMI 43.57 kg/m²      Physical Exam  Constitutional:       General: She is not in acute distress.     Appearance: Normal appearance. She is well-developed. She is obese.   HENT:      Head: Normocephalic and atraumatic.      Mouth/Throat:      Mouth: Mucous membranes are moist.   Eyes:      General: No scleral icterus.  Neck:      Trachea: No tracheal deviation.   Cardiovascular:      Rate and Rhythm: Normal rate and regular rhythm.      Heart sounds: Normal heart sounds. No murmur heard.  Pulmonary:      Effort: Pulmonary effort is normal. No respiratory distress.      Breath sounds: Normal breath sounds. No wheezing or rales.   Abdominal:      General: Bowel sounds are normal. There is no distension.      Palpations: Abdomen is soft. There is no mass.      Tenderness: There is no abdominal tenderness. There is no guarding or rebound.   Musculoskeletal:         General: Normal range of motion.      Cervical back: Normal range of motion and neck supple.   Skin:     General: Skin is warm and dry.      Coloration: Skin is not pale.      Findings: No rash.   Neurological:      Mental Status: She is alert and oriented to person, place, and time. Mental status is at baseline.   Psychiatric:         Mood and Affect: Mood normal.         Behavior: Behavior normal.           "

## 2025-02-06 RX ORDER — ALBUTEROL SULFATE 90 UG/1
INHALANT RESPIRATORY (INHALATION)
Qty: 18 G | Refills: 4 | Status: SHIPPED | OUTPATIENT
Start: 2025-02-06

## 2025-02-24 ENCOUNTER — ANESTHESIA EVENT (OUTPATIENT)
Dept: ANESTHESIOLOGY | Facility: HOSPITAL | Age: 40
End: 2025-02-24

## 2025-02-24 ENCOUNTER — ANESTHESIA (OUTPATIENT)
Dept: ANESTHESIOLOGY | Facility: HOSPITAL | Age: 40
End: 2025-02-24

## 2025-02-26 DIAGNOSIS — R10.13 EPIGASTRIC PAIN: ICD-10-CM

## 2025-02-26 DIAGNOSIS — K58.1 IRRITABLE BOWEL SYNDROME WITH CONSTIPATION: ICD-10-CM

## 2025-02-26 DIAGNOSIS — K21.9 GASTROESOPHAGEAL REFLUX DISEASE, UNSPECIFIED WHETHER ESOPHAGITIS PRESENT: ICD-10-CM

## 2025-02-27 RX ORDER — OMEPRAZOLE 40 MG/1
40 CAPSULE, DELAYED RELEASE ORAL DAILY
Qty: 90 CAPSULE | Refills: 1 | Status: SHIPPED | OUTPATIENT
Start: 2025-02-27

## 2025-03-05 ENCOUNTER — TELEPHONE (OUTPATIENT)
Dept: GASTROENTEROLOGY | Facility: CLINIC | Age: 40
End: 2025-03-05

## 2025-03-05 NOTE — TELEPHONE ENCOUNTER
lmom confirming pt's colonoscopy/egd scheduled on 3/10/25 at [facility] with Dr Espinoza.  Informed [facility] would be calling the day prior with the arrival time.  Informed of clear liquid diet day prior as well as the bowel cleansing preparation.  Informed would need a  the day of the procedure due to being under sedation. I asked pt to please call back if has  any questions.

## 2025-03-10 ENCOUNTER — ANESTHESIA EVENT (OUTPATIENT)
Dept: GASTROENTEROLOGY | Facility: AMBULARY SURGERY CENTER | Age: 40
End: 2025-03-10
Payer: COMMERCIAL

## 2025-03-10 ENCOUNTER — HOSPITAL ENCOUNTER (OUTPATIENT)
Dept: GASTROENTEROLOGY | Facility: AMBULARY SURGERY CENTER | Age: 40
Setting detail: OUTPATIENT SURGERY
Discharge: HOME | End: 2025-03-10
Attending: INTERNAL MEDICINE
Payer: COMMERCIAL

## 2025-03-10 VITALS
SYSTOLIC BLOOD PRESSURE: 127 MMHG | HEIGHT: 65 IN | DIASTOLIC BLOOD PRESSURE: 73 MMHG | OXYGEN SATURATION: 99 % | RESPIRATION RATE: 18 BRPM | TEMPERATURE: 96.8 F | HEART RATE: 66 BPM | BODY MASS INDEX: 43.49 KG/M2 | WEIGHT: 261 LBS

## 2025-03-10 DIAGNOSIS — K21.9 GASTROESOPHAGEAL REFLUX DISEASE, UNSPECIFIED WHETHER ESOPHAGITIS PRESENT: ICD-10-CM

## 2025-03-10 DIAGNOSIS — K58.1 IRRITABLE BOWEL SYNDROME WITH CONSTIPATION: ICD-10-CM

## 2025-03-10 DIAGNOSIS — R10.13 EPIGASTRIC PAIN: ICD-10-CM

## 2025-03-10 LAB
EXT PREGNANCY TEST URINE: NEGATIVE
EXT. CONTROL: NORMAL

## 2025-03-10 PROCEDURE — 43239 EGD BIOPSY SINGLE/MULTIPLE: CPT | Performed by: INTERNAL MEDICINE

## 2025-03-10 PROCEDURE — 45378 DIAGNOSTIC COLONOSCOPY: CPT | Performed by: INTERNAL MEDICINE

## 2025-03-10 PROCEDURE — 88305 TISSUE EXAM BY PATHOLOGIST: CPT | Performed by: PATHOLOGY

## 2025-03-10 PROCEDURE — 81025 URINE PREGNANCY TEST: CPT | Performed by: ANESTHESIOLOGY

## 2025-03-10 RX ORDER — SODIUM CHLORIDE, SODIUM LACTATE, POTASSIUM CHLORIDE, CALCIUM CHLORIDE 600; 310; 30; 20 MG/100ML; MG/100ML; MG/100ML; MG/100ML
125 INJECTION, SOLUTION INTRAVENOUS CONTINUOUS
Status: DISCONTINUED | OUTPATIENT
Start: 2025-03-10 | End: 2025-03-14 | Stop reason: HOSPADM

## 2025-03-10 RX ORDER — SODIUM CHLORIDE, SODIUM LACTATE, POTASSIUM CHLORIDE, CALCIUM CHLORIDE 600; 310; 30; 20 MG/100ML; MG/100ML; MG/100ML; MG/100ML
INJECTION, SOLUTION INTRAVENOUS CONTINUOUS PRN
Status: DISCONTINUED | OUTPATIENT
Start: 2025-03-10 | End: 2025-03-10

## 2025-03-10 RX ORDER — PROPOFOL 10 MG/ML
INJECTION, EMULSION INTRAVENOUS CONTINUOUS PRN
Status: DISCONTINUED | OUTPATIENT
Start: 2025-03-10 | End: 2025-03-10

## 2025-03-10 RX ORDER — SODIUM CHLORIDE, SODIUM LACTATE, POTASSIUM CHLORIDE, CALCIUM CHLORIDE 600; 310; 30; 20 MG/100ML; MG/100ML; MG/100ML; MG/100ML
125 INJECTION, SOLUTION INTRAVENOUS CONTINUOUS
Status: CANCELLED | OUTPATIENT
Start: 2025-03-10

## 2025-03-10 RX ORDER — PROPOFOL 10 MG/ML
INJECTION, EMULSION INTRAVENOUS AS NEEDED
Status: DISCONTINUED | OUTPATIENT
Start: 2025-03-10 | End: 2025-03-10

## 2025-03-10 RX ORDER — LIDOCAINE HYDROCHLORIDE 20 MG/ML
INJECTION, SOLUTION EPIDURAL; INFILTRATION; INTRACAUDAL; PERINEURAL AS NEEDED
Status: DISCONTINUED | OUTPATIENT
Start: 2025-03-10 | End: 2025-03-10

## 2025-03-10 RX ADMIN — SODIUM CHLORIDE, SODIUM LACTATE, POTASSIUM CHLORIDE, AND CALCIUM CHLORIDE: .6; .31; .03; .02 INJECTION, SOLUTION INTRAVENOUS at 09:47

## 2025-03-10 RX ADMIN — SODIUM CHLORIDE, SODIUM LACTATE, POTASSIUM CHLORIDE, AND CALCIUM CHLORIDE 125 ML/HR: .6; .31; .03; .02 INJECTION, SOLUTION INTRAVENOUS at 09:43

## 2025-03-10 RX ADMIN — LIDOCAINE HYDROCHLORIDE 100 MG: 20 INJECTION, SOLUTION EPIDURAL; INFILTRATION; INTRACAUDAL; PERINEURAL at 09:57

## 2025-03-10 RX ADMIN — PROPOFOL 50 MG: 10 INJECTION, EMULSION INTRAVENOUS at 10:00

## 2025-03-10 RX ADMIN — PROPOFOL 200 MG: 10 INJECTION, EMULSION INTRAVENOUS at 09:57

## 2025-03-10 RX ADMIN — PROPOFOL 100 MG: 10 INJECTION, EMULSION INTRAVENOUS at 09:58

## 2025-03-10 RX ADMIN — PROPOFOL 100 MCG/KG/MIN: 10 INJECTION, EMULSION INTRAVENOUS at 10:04

## 2025-03-10 RX ADMIN — PROPOFOL 5 MG: 10 INJECTION, EMULSION INTRAVENOUS at 09:59

## 2025-03-10 RX ADMIN — PROPOFOL 30 MG: 10 INJECTION, EMULSION INTRAVENOUS at 10:04

## 2025-03-10 NOTE — H&P
History and Physical -  [facility]  Neha Powers 39 y.o. female MRN: [id number]        HPI: 39-year-old female with symptoms of epigastric pain, GERD and change in bowel habits with constipation.    Historical Information   Past Medical History:   Diagnosis Date    Allergic rhinitis     Asthma     Body piercing     breast-will insert plastic    Carpal tunnel syndrome     bilateral    HPV (human papilloma virus) infection     IUD (intrauterine device) in place     Tinnitus     minimal    Wears glasses      Past Surgical History:   Procedure Laterality Date    NO PAST SURGERIES      DE NEUROPLASTY &/TRANSPOS MEDIAN NRV CARPAL TUNNE Left 2024    Procedure: LEFT CARPAL TUNNEL RELEASE, LEFT CUBITAL TUNNEL RELEASE;  Surgeon: Getachew Sandoval MD;  Location: [facility];  Service: Orthopedics    DE NEUROPLASTY &/TRANSPOSITION ULNAR NERVE ELBOW Left 2024    Procedure: LEFT CARPAL TUNNEL RELEASE, LEFT CUBITAL TUNNEL RELEASE;  Surgeon: Getachew Sandoval MD;  Location: [facility];  Service: Orthopedics     Social History   Social History     Substance and Sexual Activity   Alcohol Use Not Currently    Comment: special occasions     Social History     Substance and Sexual Activity   Drug Use Yes    Types: Marijuana    Comment: Medical Card      Social History     Tobacco Use   Smoking Status Former    Current packs/day: 0.00    Average packs/day: 0.3 packs/day for 16.0 years (4.0 ttl pk-yrs)    Types: Cigarettes    Start date: 6/15/2002    Quit date: 2018    Years since quittin.8    Passive exposure: Current   Smokeless Tobacco Former   Tobacco Comments    Medical marijuana card-     Family History   Problem Relation Age of Onset    Asthma Maternal Grandmother     Diabetes Paternal Grandmother     Diabetes Paternal Grandfather        Meds/Allergies     Not in a hospital admission.    No Known Allergies    Objective     Blood pressure 141/87, pulse 71, temperature (!) 96.8  "°F (36 °C), temperature source Tympanic, resp. rate 18, height 5' 5\" (1.651 m), weight 118 kg (261 lb), SpO2 98%.    Physical Exam:    Chest- CTA  Heart- RRR  Abdomen- NT/ND  Extremities- No edema    ASSESSMENT:     GERD, epigastric pain, constipation    PLAN:    EGD and colonoscopy              "

## 2025-03-10 NOTE — ANESTHESIA PREPROCEDURE EVALUATION
Procedure:  EGD  COLONOSCOPY    Relevant Problems   PULMONARY   (+) Asthma        Physical Exam    Airway    Mallampati score: II  TM Distance: >3 FB  Neck ROM: full     Dental   No notable dental hx     Cardiovascular  Cardiovascular exam normal    Pulmonary  Pulmonary exam normal     Other Findings  post-pubertal.      Anesthesia Plan  ASA Score- 2     Anesthesia Type- IV sedation with anesthesia with ASA Monitors.         Additional Monitors:     Airway Plan:            Plan Factors-Exercise tolerance (METS): >4 METS.    Chart reviewed.   Existing labs reviewed. Patient summary reviewed.    Patient is not a current smoker.      Obstructive sleep apnea risk education given perioperatively.        Induction-     Postoperative Plan-     Perioperative Resuscitation Plan - Level 1 - Full Code.       Informed Consent- Anesthetic plan and risks discussed with patient.  I personally reviewed this patient with the CRNA. Discussed and agreed on the Anesthesia Plan with the CRNA..      NPO Status:  Vitals Value Taken Time   Date of last liquid 03/10/25 03/10/25 0856   Time of last liquid 0300 03/10/25 0856   Date of last solid 03/08/25 03/10/25 0856   Time of last solid 1900 03/10/25 0856

## 2025-03-10 NOTE — ANESTHESIA POSTPROCEDURE EVALUATION
Post-Op Assessment Note    CV Status:  Stable  Pain Score: 0    Pain management: adequate       Mental Status:  Sleepy   Hydration Status:  Stable   PONV Controlled:  None   Airway Patency:  Patent  Two or more mitigation strategies used for obstructive sleep apnea   Post Op Vitals Reviewed: Yes    No anethesia notable event occurred.    Staff: CRNA           Last Filed PACU Vitals:  Vitals Value Taken Time   Temp     Pulse 64    /66    Resp 16    SpO2 99

## 2025-03-10 NOTE — PERIOPERATIVE NURSING NOTE
Patient received intoPhase 2 room via stretcher from GI procedural room. Report received from RN and CRNA at bedside. Patient is sleepy, no signs of distress noted. Stretcher locked and in the lowest position. Side rails up on the both sides, call bell within patient's reach. Small amount of blood noted from Left nare from nasal trumpet in place during procedure.

## 2025-03-13 PROCEDURE — 88305 TISSUE EXAM BY PATHOLOGIST: CPT | Performed by: PATHOLOGY

## 2025-03-15 ENCOUNTER — RESULTS FOLLOW-UP (OUTPATIENT)
Dept: GASTROENTEROLOGY | Facility: AMBULARY SURGERY CENTER | Age: 40
End: 2025-03-15

## 2025-03-20 ENCOUNTER — HOSPITAL ENCOUNTER (OUTPATIENT)
Dept: RADIOLOGY | Facility: HOSPITAL | Age: 40
Discharge: HOME | End: 2025-03-20
Payer: COMMERCIAL

## 2025-03-20 VITALS — BODY MASS INDEX: 43.32 KG/M2 | HEIGHT: 65 IN | WEIGHT: 260 LBS

## 2025-03-20 DIAGNOSIS — Z12.31 ENCOUNTER FOR SCREENING MAMMOGRAM FOR MALIGNANT NEOPLASM OF BREAST: ICD-10-CM

## 2025-03-20 PROCEDURE — 77063 BREAST TOMOSYNTHESIS BI: CPT

## 2025-03-20 PROCEDURE — 77067 SCR MAMMO BI INCL CAD: CPT

## 2025-03-24 ENCOUNTER — RESULTS FOLLOW-UP (OUTPATIENT)
Dept: OBGYN CLINIC | Facility: CLINIC | Age: 40
End: 2025-03-24

## 2025-04-02 ENCOUNTER — PROCEDURE VISIT (OUTPATIENT)
Dept: OBGYN CLINIC | Facility: CLINIC | Age: 40
End: 2025-04-02
Payer: COMMERCIAL

## 2025-04-02 VITALS
SYSTOLIC BLOOD PRESSURE: 120 MMHG | BODY MASS INDEX: 43.99 KG/M2 | HEIGHT: 65 IN | WEIGHT: 264 LBS | DIASTOLIC BLOOD PRESSURE: 80 MMHG

## 2025-04-02 DIAGNOSIS — Z30.433 ENCOUNTER FOR IUD REMOVAL AND REINSERTION: Primary | ICD-10-CM

## 2025-04-02 LAB — SL AMB POCT URINE HCG: NEGATIVE

## 2025-04-02 PROCEDURE — 58301 REMOVE INTRAUTERINE DEVICE: CPT

## 2025-04-02 PROCEDURE — 81025 URINE PREGNANCY TEST: CPT

## 2025-04-02 PROCEDURE — 58300 INSERT INTRAUTERINE DEVICE: CPT

## 2025-04-02 NOTE — PROGRESS NOTES
IUD Procedure    Date/Time: 4/2/2025 10:30 AM    Performed by: Denisha Jonhson PA-C  Authorized by: Denisha Johnson PA-C    Other Assisting Provider: No    Verbal consent obtained?: Yes    Written consent obtained?: Yes    Risks and benefits: Risks, benefits and alternatives were discussed    Consent given by:  Patient  Patient states understanding of procedure being performed: Yes    Patient's understanding of procedure matches consent: Yes    Procedure consent matches procedure scheduled: Yes    Required items: Required blood products, implants, devices and special equipment available    Patient identity confirmed:  Verbally with patient  Select procedure: IUD removal and insertion    IUD Insertion:     Pelvic exam performed: yes      Negative urine pregnancy test: yes      Cervix cleaned and prepped: yes      Speculum placed in vagina: yes      Tenaculum applied to cervix: yes      IUD inserted with no complications: yes      Strings trimmed: yes (3-4cm)      Uterus sounded: yes      Uterus sound depth (cm):  7    IUD type:  1 each Levonorgestrel 20 MCG/DAY  Post-procedure:     Patient tolerated procedure well: yes      Patient will follow up after next period: yes (in 4-6 weeks)    Insertion Comments:      Patient presented today for an IUD insertion for contraception . She is feeling well at this time and has no complaints. The patient was placed in dorsal lithotomy position and the speculum was inserted into the vagina. The cervix was prepped and tenaculum placed on the anterior lip. The uterus was sounded to 7 cm. The IUD was placed without difficulty and the strings trimmed. The tenaculum was removed. Bleeding from tenaculum site was controlled using pressure with cotton swabs . The patient tolerated the procedure well and without complication. Patient was advised to use warm compresses and take OTC pain relievers for pain control over the next few days. Risks, benefits and alternatives were  discussed with the patient. We discussed possible complications and risks, including infection, bleeding, pelvic pain, expulsion, failure to protect against pregnancy, uterine perforation with need for laparoscopy to retrieve the device, migration of device and increased risk of ectopic should pregnancy occur.  We reviewed irregular bleeding patterns that should improve with time, with high level of patient satisfaction regarding bleeding profile at 1 year.  All questions were answered to apparent satisfaction. Patient is to RTO in 4-6 weeks for IUD string check.       IUD Removal:     Reason for removal: patient request      Reason for removal comment:  Started to get menses after previously not having menses with IUD    Removed without complications: yes      Strings visualized: yes      IUD intact: yes    Removal Comments:      The patient presented for IUD removal today due to patient starting to get menses after previously not having menses with IUD. She is feeling well at this time. Patient was placed in dorsal lithotomy position and a speculum was inserted into the vagina. The IUD strings were visualized at the cervical os. The strings were grasped with ringed forceps and steady traction was applied. The IUD was removed intact and without complication. The patient tolerated the procedure well.

## 2025-05-13 ENCOUNTER — RA CDI HCC (OUTPATIENT)
Dept: OTHER | Facility: HOSPITAL | Age: 40
End: 2025-05-13

## 2025-05-13 DIAGNOSIS — E66.01 MORBID OBESITY (HCC): Primary | ICD-10-CM

## 2025-05-13 NOTE — PROGRESS NOTES
HCC coding opportunities          Chart Reviewed number of suggestions sent to Provider: 1     Patients Insurance        Commercial Insurance: [employer] Commercial Insurance     E66.01

## 2025-05-16 ENCOUNTER — ANNUAL EXAM (OUTPATIENT)
Dept: OBGYN CLINIC | Facility: CLINIC | Age: 40
End: 2025-05-16

## 2025-05-16 ENCOUNTER — APPOINTMENT (OUTPATIENT)
Dept: LAB | Facility: AMBULARY SURGERY CENTER | Age: 40
End: 2025-05-16
Payer: COMMERCIAL

## 2025-05-16 VITALS
DIASTOLIC BLOOD PRESSURE: 76 MMHG | HEIGHT: 65 IN | WEIGHT: 257.4 LBS | SYSTOLIC BLOOD PRESSURE: 120 MMHG | BODY MASS INDEX: 42.88 KG/M2

## 2025-05-16 DIAGNOSIS — R23.2 HOT FLASHES: ICD-10-CM

## 2025-05-16 DIAGNOSIS — R45.86 MOOD CHANGES: ICD-10-CM

## 2025-05-16 DIAGNOSIS — N95.1 PERIMENOPAUSE: ICD-10-CM

## 2025-05-16 DIAGNOSIS — Z11.3 SCREENING EXAMINATION FOR STD (SEXUALLY TRANSMITTED DISEASE): ICD-10-CM

## 2025-05-16 DIAGNOSIS — Z01.419 WELL WOMAN EXAM WITH ROUTINE GYNECOLOGICAL EXAM: Primary | ICD-10-CM

## 2025-05-16 DIAGNOSIS — R63.5 WEIGHT GAIN: ICD-10-CM

## 2025-05-16 LAB
ALBUMIN SERPL BCG-MCNC: 4.2 G/DL (ref 3.5–5)
ALP SERPL-CCNC: 60 U/L (ref 34–104)
ALT SERPL W P-5'-P-CCNC: 15 U/L (ref 7–52)
ANION GAP SERPL CALCULATED.3IONS-SCNC: 10 MMOL/L (ref 4–13)
AST SERPL W P-5'-P-CCNC: 16 U/L (ref 13–39)
BILIRUB SERPL-MCNC: 0.26 MG/DL (ref 0.2–1)
BUN SERPL-MCNC: 15 MG/DL (ref 5–25)
CALCIUM SERPL-MCNC: 9.3 MG/DL (ref 8.4–10.2)
CHLORIDE SERPL-SCNC: 104 MMOL/L (ref 96–108)
CO2 SERPL-SCNC: 25 MMOL/L (ref 21–32)
CREAT SERPL-MCNC: 0.8 MG/DL (ref 0.6–1.3)
ERYTHROCYTE [DISTWIDTH] IN BLOOD BY AUTOMATED COUNT: 13 % (ref 11.6–15.1)
ESTRADIOL SERPL-MCNC: 20.2 PG/ML
FSH SERPL-ACNC: 10.7 MIU/ML
GFR SERPL CREATININE-BSD FRML MDRD: 92 ML/MIN/1.73SQ M
GLUCOSE P FAST SERPL-MCNC: 99 MG/DL (ref 65–99)
HCT VFR BLD AUTO: 37.6 % (ref 34.8–46.1)
HGB BLD-MCNC: 12.4 G/DL (ref 11.5–15.4)
INSULIN SERPL-ACNC: 11.21 UIU/ML (ref 1.9–23)
MCH RBC QN AUTO: 29.5 PG (ref 26.8–34.3)
MCHC RBC AUTO-ENTMCNC: 33 G/DL (ref 31.4–37.4)
MCV RBC AUTO: 89 FL (ref 82–98)
PLATELET # BLD AUTO: 255 THOUSANDS/UL (ref 149–390)
PMV BLD AUTO: 11.3 FL (ref 8.9–12.7)
POTASSIUM SERPL-SCNC: 3.9 MMOL/L (ref 3.5–5.3)
PROT SERPL-MCNC: 7 G/DL (ref 6.4–8.4)
RBC # BLD AUTO: 4.21 MILLION/UL (ref 3.81–5.12)
SODIUM SERPL-SCNC: 139 MMOL/L (ref 135–147)
T4 FREE SERPL-MCNC: 0.9 NG/DL (ref 0.61–1.12)
TSH SERPL DL<=0.05 MIU/L-ACNC: 1.35 UIU/ML (ref 0.45–4.5)
WBC # BLD AUTO: 7.29 THOUSAND/UL (ref 4.31–10.16)

## 2025-05-16 PROCEDURE — 83525 ASSAY OF INSULIN: CPT

## 2025-05-16 PROCEDURE — 87491 CHLMYD TRACH DNA AMP PROBE: CPT

## 2025-05-16 PROCEDURE — 85027 COMPLETE CBC AUTOMATED: CPT

## 2025-05-16 PROCEDURE — 84439 ASSAY OF FREE THYROXINE: CPT

## 2025-05-16 PROCEDURE — 87389 HIV-1 AG W/HIV-1&-2 AB AG IA: CPT

## 2025-05-16 PROCEDURE — 84443 ASSAY THYROID STIM HORMONE: CPT

## 2025-05-16 PROCEDURE — 86780 TREPONEMA PALLIDUM: CPT

## 2025-05-16 PROCEDURE — 83001 ASSAY OF GONADOTROPIN (FSH): CPT

## 2025-05-16 PROCEDURE — 86803 HEPATITIS C AB TEST: CPT

## 2025-05-16 PROCEDURE — 80053 COMPREHEN METABOLIC PANEL: CPT

## 2025-05-16 PROCEDURE — 87591 N.GONORRHOEAE DNA AMP PROB: CPT

## 2025-05-16 PROCEDURE — 36415 COLL VENOUS BLD VENIPUNCTURE: CPT

## 2025-05-16 PROCEDURE — 82670 ASSAY OF TOTAL ESTRADIOL: CPT

## 2025-05-16 PROCEDURE — 87340 HEPATITIS B SURFACE AG IA: CPT

## 2025-05-16 NOTE — PROGRESS NOTES
ASSESSMENT & PLAN:   Diagnoses and all orders for this visit:    Well woman exam with routine gynecological exam    Perimenopause  -     CBC and Platelet; Future  -     TSH, 3rd generation; Future  -     Hemoglobin A1C; Future  -     Insulin, fasting; Future  -     T4, free; Future  -     Follicle stimulating hormone; Future  -     Estradiol; Future  -     Comprehensive metabolic panel; Future    Hot flashes  -     CBC and Platelet; Future  -     TSH, 3rd generation; Future  -     Hemoglobin A1C; Future  -     Insulin, fasting; Future  -     T4, free; Future  -     Follicle stimulating hormone; Future  -     Estradiol; Future  -     Comprehensive metabolic panel; Future    Mood changes  -     CBC and Platelet; Future  -     TSH, 3rd generation; Future  -     Hemoglobin A1C; Future  -     Insulin, fasting; Future  -     T4, free; Future  -     Follicle stimulating hormone; Future  -     Estradiol; Future  -     Comprehensive metabolic panel; Future    Weight gain  -     CBC and Platelet; Future  -     TSH, 3rd generation; Future  -     Hemoglobin A1C; Future  -     Insulin, fasting; Future  -     T4, free; Future  -     Follicle stimulating hormone; Future  -     Estradiol; Future  -     Comprehensive metabolic panel; Future    Screening examination for STD (sexually transmitted disease)  -     Hepatitis B surface antigen; Future  -     Hepatitis C antibody; Future  -     HIV 1/2 AG/AB w Reflex [facility] for 2 yr old and above; Future  -     RPR-Syphilis Screening (Total Syphilis IGG/IGM); Future  -     Chlamydia/GC amplified DNA by PCR      The following were reviewed in today's visit: ASCCP guidelines, Gardisil vaccination, STD testing breast self exam, mammography screening ordered, STD testing, menopause, exercise, and healthy diet.    Patient to return to office in yearly for annual exam.     All questions have been answered to her satisfaction.    CC:  Annual Gynecologic Examination  Chief Complaint   Patient  presents with    Gynecologic Exam     Annual exam. Pt is concerned about hormone imbalance. States she has insomnia, hot flashes, chin hair, mood swings and weight gain.     HPI: Neha Powers is a 39 y.o.  who presents for annual gynecologic examination.  She has the following concerns:  hair growth of chin leading her to pluck/shave daily, weight gain of 15-20lbs in the past 1 yr despite regularly working out with yoga/pilates, increased irritability, night sweats. She feels like these symptoms have been going on the past 1 year but have been more significant in the past few months. She is concerned she may be beginning to go through menopause as her mother was 44 y/o when she completed menopause.     Mirena IUD in place - inserted 2025. Random spotting with IUD.     Health Maintenance:    Exercise: intermittently  Breast exams/breast awareness: yes  Last mammogram: 3/25/2025, BIRADS0, she is scheduled for diagnostic imaging 25    Past Medical History:   Diagnosis Date    Allergic rhinitis     Asthma     Body piercing     breast-will insert plastic    Carpal tunnel syndrome     bilateral    Herpes     Confirmed mid     HPV (human papilloma virus) infection     IUD (intrauterine device) in place     Tinnitus     minimal    Wears glasses      Past Surgical History:   Procedure Laterality Date    NO PAST SURGERIES      VT NEUROPLASTY &/TRANSPOS MEDIAN NRV CARPAL TUNNE Left 2024    Procedure: LEFT CARPAL TUNNEL RELEASE, LEFT CUBITAL TUNNEL RELEASE;  Surgeon: Getachew Sandoval MD;  Location: [facility] OR;  Service: Orthopedics    VT NEUROPLASTY &/TRANSPOSITION ULNAR NERVE ELBOW Left 2024    Procedure: LEFT CARPAL TUNNEL RELEASE, LEFT CUBITAL TUNNEL RELEASE;  Surgeon: Getachew Sandoval MD;  Location: [facility];  Service: Orthopedics     Past OB/Gyn History:   Patient's last menstrual period was 2025.    Last Pap: 2023: no abnormalities  History of abnormal Pap  smear: Yes   HPV vaccine completed: yes    Patient is currently sexually active.   STD testing: yes  Current contraception: IUD - Mirena    Family History  Family History   Problem Relation Age of Onset    Asthma Maternal Grandmother     Diabetes Paternal Grandmother     Diabetes Paternal Grandfather     Ovarian cancer Maternal Cousin 13     Family history of uterine or ovarian cancer: yes, ovarian - maternal cousin  Family history of breast cancer: no  Family history of colon cancer: no    Social History:  Social History     Socioeconomic History    Marital status: Single     Spouse name: Not on file    Number of children: Not on file    Years of education: Not on file    Highest education level: Not on file   Occupational History    Not on file   Tobacco Use    Smoking status: Former     Current packs/day: 0.00     Average packs/day: 0.3 packs/day for 16.0 years (4.0 ttl pk-yrs)     Types: Cigarettes     Start date: 6/15/2002     Quit date: 2018     Years since quittin.0     Passive exposure: Current    Smokeless tobacco: Former    Tobacco comments:     Medical marijuana card-   Vaping Use    Vaping status: Former    Substances: THC   Substance and Sexual Activity    Alcohol use: Not Currently     Comment: special occasions    Drug use: Yes     Frequency: 7.0 times per week     Types: Marijuana     Comment: Medically prescribed patient    Sexual activity: Yes     Partners: Male     Birth control/protection: I.U.D.   Other Topics Concern    Not on file   Social History Narrative    Not on file     Social Drivers of Health     Financial Resource Strain: Not on file   Food Insecurity: Not on file   Transportation Needs: Not on file   Physical Activity: Not on file   Stress: Not on file   Social Connections: Not on file   Intimate Partner Violence: Not on file   Housing Stability: Not on file     Domestic violence screen: negative    Allergies:  Allergies[1]    Medications:  Current Medications[2]    Review of  "Systems:  Review of Systems   Constitutional:  Positive for unexpected weight change (weight gain). Negative for chills and fever.   HENT:          Chin hair growth   Respiratory:  Negative for shortness of breath.    Cardiovascular:  Negative for chest pain.   Gastrointestinal:  Negative for abdominal pain, constipation and diarrhea.   Endocrine: Positive for heat intolerance.   Genitourinary:  Negative for dysuria, frequency, pelvic pain, vaginal discharge and vaginal pain.   Psychiatric/Behavioral:  Positive for agitation. Negative for self-injury and suicidal ideas.          Physical Exam:  /76 (BP Location: Left arm, Patient Position: Sitting, Cuff Size: Adult)   Ht 5' 5\" (1.651 m)   Wt 117 kg (257 lb 6.4 oz)   LMP 05/13/2025   BMI 42.83 kg/m²    Physical Exam  Constitutional:       Appearance: Normal appearance.   Genitourinary:      Vulva and urethral meatus normal.      No labial fusion noted.      No vaginal erythema or tenderness.        Right Adnexa: not tender.     Left Adnexa: not tender.     No cervical discharge or friability.      Uterus is not tender.   Breasts:     Breasts are symmetrical.      Right: Normal.      Left: Normal.   HENT:      Head: Normocephalic and atraumatic.   Neck:      Thyroid: No thyroid mass or thyroid tenderness.     Cardiovascular:      Rate and Rhythm: Normal rate and regular rhythm.      Heart sounds: Normal heart sounds. No murmur heard.  Pulmonary:      Effort: Pulmonary effort is normal.      Breath sounds: Normal breath sounds. No wheezing.   Abdominal:      Palpations: Abdomen is soft. There is no mass.      Tenderness: There is no abdominal tenderness.   Lymphadenopathy:      Cervical: No cervical adenopathy.     Neurological:      General: No focal deficit present.      Mental Status: She is alert and oriented to person, place, and time.     Skin:     General: Skin is warm and dry.     Psychiatric:         Mood and Affect: Mood normal.         Behavior: " Behavior normal.   Vitals and nursing note reviewed.                                     [1] No Known Allergies  [2]   Current Outpatient Medications:     albuterol (PROVENTIL HFA,VENTOLIN HFA) 90 mcg/act inhaler, INHALE 2 PUFFS BY MOUTH EVERY 4 HOURS AS NEEDED FOR WHEEZING, Disp: 18 g, Rfl: 4    cetirizine (ZyrTEC) 10 mg tablet, Take 10 mg by mouth in the morning., Disp: , Rfl:     FIBER GUMMIES PO, Take by mouth in the morning and in the evening and before bedtime., Disp: , Rfl:     levonorgestrel (MIRENA) 20 MCG/24HR IUD, 1 each by Intrauterine route once, Disp: , Rfl:     linaCLOtide (Linzess) 145 MCG CAPS, Take 1 capsule (145 mcg total) by mouth daily in the early morning, Disp: 90 capsule, Rfl: 3    multivitamin (THERAGRAN) TABS, Take 1 tablet by mouth in the morning., Disp: , Rfl:     omeprazole (PriLOSEC) 40 MG capsule, TAKE 1 CAPSULE (40 MG TOTAL) BY MOUTH DAILY., Disp: 90 capsule, Rfl: 1    valACYclovir (VALTREX) 500 mg tablet, Take 1 tablet (500 mg total) by mouth daily, Disp: 90 tablet, Rfl: 1    polyethylene glycol (GaviLyte-G) 4000 mL solution, Take 4,000 mL by mouth once for 1 dose Per office instructions (Patient not taking: Reported on 5/16/2025), Disp: 4000 mL, Rfl: 0

## 2025-05-17 LAB
C TRACH DNA SPEC QL NAA+PROBE: NEGATIVE
HBV SURFACE AG SER QL: NORMAL
HCV AB SER QL: NORMAL
HIV 1+2 AB+HIV1 P24 AG SERPL QL IA: NORMAL
N GONORRHOEA DNA SPEC QL NAA+PROBE: NEGATIVE
TREPONEMA PALLIDUM IGG+IGM AB [PRESENCE] IN SERUM OR PLASMA BY IMMUNOASSAY: NORMAL

## 2025-05-19 ENCOUNTER — RESULTS FOLLOW-UP (OUTPATIENT)
Dept: OBGYN CLINIC | Facility: CLINIC | Age: 40
End: 2025-05-19

## 2025-05-20 ENCOUNTER — OFFICE VISIT (OUTPATIENT)
Dept: FAMILY MEDICINE CLINIC | Facility: CLINIC | Age: 40
End: 2025-05-20
Payer: COMMERCIAL

## 2025-05-20 VITALS
TEMPERATURE: 98.1 F | WEIGHT: 255 LBS | HEART RATE: 73 BPM | DIASTOLIC BLOOD PRESSURE: 72 MMHG | SYSTOLIC BLOOD PRESSURE: 116 MMHG | OXYGEN SATURATION: 100 % | BODY MASS INDEX: 42.49 KG/M2 | RESPIRATION RATE: 16 BRPM | HEIGHT: 65 IN

## 2025-05-20 DIAGNOSIS — J45.20 MILD INTERMITTENT ASTHMA, UNSPECIFIED WHETHER COMPLICATED: ICD-10-CM

## 2025-05-20 DIAGNOSIS — K21.9 GASTROESOPHAGEAL REFLUX DISEASE WITHOUT ESOPHAGITIS: Primary | ICD-10-CM

## 2025-05-20 DIAGNOSIS — G56.03 BILATERAL CARPAL TUNNEL SYNDROME: ICD-10-CM

## 2025-05-20 PROCEDURE — 99214 OFFICE O/P EST MOD 30 MIN: CPT | Performed by: FAMILY MEDICINE

## 2025-05-20 NOTE — PROGRESS NOTES
"Name: Neha Powers      : 1985      MRN: [id number]  Encounter Provider: Manda Barrett MD  Encounter Date: 2025   Encounter department: [facility]  :  Assessment & Plan  Mild intermittent asthma, unspecified whether complicated  Stable.        Gastroesophageal reflux disease without esophagitis  Follows GI. On prilosec.        Bilateral carpal tunnel syndrome  She recently had left hand carpal tunnel surgery. She may get right side surgery in the future.                 History of Present Illness   HPI  Neha Powers is a 39 y.o. female who is here today for routine follow up visit.   Reports no acute issues/concerns in office today.   Has been compliant with current medications.    Review of Systems   Constitutional: Negative.    HENT: Negative.     Eyes: Negative.    Respiratory: Negative.     Cardiovascular: Negative.    Gastrointestinal: Negative.    Endocrine: Negative.    Genitourinary: Negative.    Musculoskeletal: Negative.    Skin: Negative.    Allergic/Immunologic: Negative.    Neurological: Negative.    Hematological: Negative.    Psychiatric/Behavioral: Negative.         Objective   /72 (BP Location: Left arm, Patient Position: Sitting, Cuff Size: Large)   Pulse 73   Temp 98.1 °F (36.7 °C) (Temporal)   Resp 16   Ht 5' 5\" (1.651 m)   Wt 116 kg (255 lb)   LMP 2025 (Exact Date)   SpO2 100%   BMI 42.43 kg/m²      Physical Exam  Constitutional:       General: She is not in acute distress.     Appearance: She is well-developed. She is not diaphoretic.   HENT:      Head: Normocephalic and atraumatic.     Cardiovascular:      Rate and Rhythm: Normal rate and regular rhythm.      Heart sounds: Normal heart sounds. No murmur heard.     No friction rub. No gallop.   Pulmonary:      Effort: Pulmonary effort is normal. No respiratory distress.      Breath sounds: Normal breath sounds. No wheezing or rales.   Chest:      Chest wall: No tenderness.     Musculoskeletal: "         General: No deformity. Normal range of motion.      Cervical back: Normal range of motion and neck supple.     Skin:     General: Skin is warm and dry.     Neurological:      Mental Status: She is alert and oriented to person, place, and time.     Psychiatric:         Behavior: Behavior normal.         Thought Content: Thought content normal.         Judgment: Judgment normal.

## 2025-05-20 NOTE — ASSESSMENT & PLAN NOTE
She recently had left hand carpal tunnel surgery. She may get right side surgery in the future.

## 2025-05-26 DIAGNOSIS — B00.9 HSV INFECTION: ICD-10-CM

## 2025-05-27 DIAGNOSIS — K59.00 CONSTIPATION, UNSPECIFIED CONSTIPATION TYPE: ICD-10-CM

## 2025-05-27 RX ORDER — LINACLOTIDE 145 UG/1
145 CAPSULE, GELATIN COATED ORAL
Qty: 90 CAPSULE | Refills: 1 | Status: SHIPPED | OUTPATIENT
Start: 2025-05-27

## 2025-05-28 ENCOUNTER — HOSPITAL ENCOUNTER (OUTPATIENT)
Dept: RADIOLOGY | Facility: HOSPITAL | Age: 40
Discharge: HOME | End: 2025-05-28
Attending: FAMILY MEDICINE
Payer: COMMERCIAL

## 2025-05-28 VITALS — WEIGHT: 250 LBS | BODY MASS INDEX: 41.65 KG/M2 | HEIGHT: 65 IN

## 2025-05-28 DIAGNOSIS — R92.8 ABNORMAL MAMMOGRAM: ICD-10-CM

## 2025-05-28 PROCEDURE — 76642 ULTRASOUND BREAST LIMITED: CPT

## 2025-05-28 PROCEDURE — 77065 DX MAMMO INCL CAD UNI: CPT

## 2025-05-28 PROCEDURE — G0279 TOMOSYNTHESIS, MAMMO: HCPCS

## 2025-05-28 RX ORDER — VALACYCLOVIR HYDROCHLORIDE 500 MG/1
500 TABLET, FILM COATED ORAL DAILY
Qty: 90 TABLET | Refills: 0 | Status: SHIPPED | OUTPATIENT
Start: 2025-05-28 | End: 2025-11-24

## 2025-05-28 NOTE — PROGRESS NOTES
Met with patient Neha and radiologist Dr. Duff in RN office regarding recommendation for:     _____ Right ___x___ Left      __x__ Ultrasound guided breast biopsy    _____ Stereotactic breast biopsy.      __x___ Verbalized understanding.      Blood thinners:  _____ yes ___x___ no    Date stopped: (not applicable)    Biopsy teaching sheet given:  __x___ yes _____ no    Tentative biopsy appointment: Monday 06/30/2025 @ 1pm [facility] [place].    Our breast center is located at [address] [place] [address]. The breast center is located on the first floor/ main entry level of [facility]. Please check in @ the main entrance/ main lobby of the hospital for your outpatient procedure.    Neha is aware she does not need to fast for a breast biopsy procedure, no special soap/ surgical scrubs need to be used the night before or morning of her procedure, and no changes need to be made to her normal medication schedule. Wear a comfortable two piece outfit to your appointment, and wear/ or bring a supportive bra with you to your appointment as this will help hold the ice pack in place that we would like you to use post-procedure.

## 2025-06-05 ENCOUNTER — OFFICE VISIT (OUTPATIENT)
Dept: OBGYN CLINIC | Facility: CLINIC | Age: 40
End: 2025-06-05
Payer: COMMERCIAL

## 2025-06-05 VITALS
WEIGHT: 251 LBS | DIASTOLIC BLOOD PRESSURE: 76 MMHG | SYSTOLIC BLOOD PRESSURE: 124 MMHG | HEIGHT: 65 IN | BODY MASS INDEX: 41.82 KG/M2

## 2025-06-05 DIAGNOSIS — Z30.2 REQUEST FOR STERILIZATION: Primary | ICD-10-CM

## 2025-06-05 PROCEDURE — 99214 OFFICE O/P EST MOD 30 MIN: CPT | Performed by: OBSTETRICS & GYNECOLOGY

## 2025-06-05 NOTE — PROGRESS NOTES
Name: Neha Powers      : 1985      MRN: [id number]  Encounter Provider: Doris Min MD  Encounter Date: 2025   Encounter department: [facility]  :  Assessment & Plan  Request for sterilization  Neha understands that sterilization is permanent and irreversible.   If she were to desire pregnancy in the future she would require IVF.   Additionally, she understands that her Mirena IUD is an excellent form of contraception that does not require surgery.   Her IUD is very effective in preventing pregnancy. She understands this and continues to voice wanting sterilization.   We discussed risks of surgery including: pain, bleeding, infection, VTE, injury to surrounding structures in the abdomen, need for laparotomy. Additional risks to this procedure- risk of regret, failure resulting in ectopic pregnancy. She accepts blood transfusion and is full code. Consent was signed with patient today.   Pre op wash and instructions were provided. Will reach out to surgery scheduler to get her booked.   All questions answered to the best of my ability.   Orders:    Case request operating room: EXAM UNDER ANESTHESIA (EUA), SALPINGECTOMY, LAPAROSCOPIC; Standing        History of Present Illness     Neha Powers is a 39 y.o. female who presents to discuss permanent sterilization.   She is a P0. She knows that she never wants to have children.   She currently has mirena IUD. This is her 3rd mirena since she was 24.   She uses this for bleeding purposes and for pregnancy prevention.   Most recent IUD was placed 2025. Neha has desired sterilization since she was in her 20s.     Last pap - NILM     Surgical history: carpal tunnel  Medical history: asthma     Review of Systems   Constitutional:  Negative for chills and fever.   Eyes:  Negative for visual disturbance.   Respiratory:  Negative for shortness of breath.    Cardiovascular:  Negative for chest pain.   Gastrointestinal:   "Negative for abdominal pain, nausea and vomiting.   Genitourinary:  Negative for vaginal bleeding.   Musculoskeletal:  Negative for back pain.   Skin:  Negative for rash.   Neurological:  Negative for dizziness and light-headedness.   Psychiatric/Behavioral:  The patient is not nervous/anxious.           Objective   /76 (BP Location: Right arm, Patient Position: Sitting, Cuff Size: Large)   Ht 5' 5\" (1.651 m)   Wt 114 kg (251 lb)   LMP 05/13/2025 (Exact Date)   BMI 41.77 kg/m²      Physical Exam  Constitutional:       General: She is not in acute distress.     Appearance: Normal appearance. She is not ill-appearing, toxic-appearing or diaphoretic.   HENT:      Head: Normocephalic and atraumatic.     Cardiovascular:      Rate and Rhythm: Normal rate and regular rhythm.      Pulses: Normal pulses.   Pulmonary:      Effort: Pulmonary effort is normal. No respiratory distress.      Breath sounds: Normal breath sounds. No stridor. No wheezing or rhonchi.   Abdominal:      General: There is no distension.      Palpations: Abdomen is soft. There is no mass.      Tenderness: There is no abdominal tenderness. There is no guarding or rebound.      Hernia: No hernia is present.   Genitourinary:     Comments: Deferred     Musculoskeletal:         General: Normal range of motion.      Cervical back: Normal range of motion.     Skin:     General: Skin is warm and dry.     Neurological:      General: No focal deficit present.      Mental Status: She is alert. Mental status is at baseline.     Psychiatric:         Mood and Affect: Mood normal.         Behavior: Behavior normal.         Thought Content: Thought content normal.         Judgment: Judgment normal.       "

## 2025-06-09 ENCOUNTER — TELEPHONE (OUTPATIENT)
Dept: OBGYN CLINIC | Facility: CLINIC | Age: 40
End: 2025-06-09

## 2025-06-09 NOTE — TELEPHONE ENCOUNTER
MD Micheline Cross MA  [facility] OB GYN Department  Surgery Scheduling Sheet    Patient Name: Neha Powers  : 1985    Provider: Doris Min MD     Needed: no; Language: N/A    Procedure: exam under anesthesia and bilateral salpingectomy    Diagnosis: request for sterilization    Special Needs or Equipment: none    Anesthesia: General anesthesia    Length of stay: outpatient  Does patient have comorbid conditions that will require close perioperative monitoring prior to safe discharge: no    The patient has comorbid conditions that will require close perioperative monitoring prior to safe discharge, including N/A.  This may require acute care beyond the usual and routine recovery period. As such, inpatient admission post-operatively is expected and appropriate, and anticipated hospital length of stay will be >2 midnights.    Pre-Admission Testing Needed: yes  Labs that should be ordered: urine pregnancy test    Order PAT that is recommended in prep for procedure?: Yes    Medical Clearance Needed: no; Provider: N/A    MA Form Signed (tubals/hysterectomy): Not Indicated    Surgical Drink Given: no    How many days out of work: 1 week(s)    How many days no drivin week(s)      Is pre op appt needed?  no  Interval for post op appt: 2 week(s)      For Surgical Scheduler:    Surgery Scheduled On:  [place]: [facility]    Pre-op Appt:  NOT NEEDED per surgical  .phrase   Post op Appt:  10/27/2025  Consult/Medical clearance appt:

## 2025-06-09 NOTE — TELEPHONE ENCOUNTER
Talked to patient she is scheduled for her surgical procedure with Dr. Mni on 10/13/2025 at the [facility]. Patient is aware of lab work and EKG needed prior to her surgical procedure. 2 week post op scheduled for 10/27/2025

## 2025-06-30 ENCOUNTER — HOSPITAL ENCOUNTER (OUTPATIENT)
Dept: RADIOLOGY | Facility: HOSPITAL | Age: 40
Discharge: HOME | End: 2025-06-30
Attending: FAMILY MEDICINE

## 2025-07-08 ENCOUNTER — TELEPHONE (OUTPATIENT)
Dept: RADIOLOGY | Facility: HOSPITAL | Age: 40
End: 2025-07-08

## 2025-07-10 ENCOUNTER — TELEPHONE (OUTPATIENT)
Dept: RADIOLOGY | Facility: HOSPITAL | Age: 40
End: 2025-07-10

## 2025-07-28 ENCOUNTER — OFFICE VISIT (OUTPATIENT)
Age: 40
End: 2025-07-28
Payer: COMMERCIAL

## 2025-07-28 VITALS
OXYGEN SATURATION: 98 % | BODY MASS INDEX: 43.15 KG/M2 | SYSTOLIC BLOOD PRESSURE: 133 MMHG | HEIGHT: 65 IN | WEIGHT: 259 LBS | HEART RATE: 72 BPM | DIASTOLIC BLOOD PRESSURE: 94 MMHG

## 2025-07-28 DIAGNOSIS — K21.9 GASTROESOPHAGEAL REFLUX DISEASE WITHOUT ESOPHAGITIS: ICD-10-CM

## 2025-07-28 DIAGNOSIS — K58.1 IRRITABLE BOWEL SYNDROME WITH CONSTIPATION: Primary | ICD-10-CM

## 2025-07-28 PROCEDURE — 99213 OFFICE O/P EST LOW 20 MIN: CPT | Performed by: PHYSICIAN ASSISTANT

## 2025-08-04 ENCOUNTER — HOSPITAL ENCOUNTER (OUTPATIENT)
Dept: RADIOLOGY | Facility: HOSPITAL | Age: 40
Discharge: HOME | End: 2025-08-04
Payer: COMMERCIAL

## 2025-08-04 ENCOUNTER — HOSPITAL ENCOUNTER (OUTPATIENT)
Dept: RADIOLOGY | Facility: HOSPITAL | Age: 40
Discharge: HOME | End: 2025-08-04
Attending: FAMILY MEDICINE
Payer: COMMERCIAL

## 2025-08-04 DIAGNOSIS — R92.8 ABNORMAL MAMMOGRAM OF LEFT BREAST: ICD-10-CM

## 2025-08-04 PROCEDURE — 88341 IMHCHEM/IMCYTCHM EA ADD ANTB: CPT | Performed by: PATHOLOGY

## 2025-08-04 PROCEDURE — A4648 IMPLANTABLE TISSUE MARKER: HCPCS

## 2025-08-04 PROCEDURE — 88305 TISSUE EXAM BY PATHOLOGIST: CPT | Performed by: PATHOLOGY

## 2025-08-04 PROCEDURE — 88342 IMHCHEM/IMCYTCHM 1ST ANTB: CPT | Performed by: PATHOLOGY

## 2025-08-04 PROCEDURE — 19083 BX BREAST 1ST LESION US IMAG: CPT

## 2025-08-04 RX ORDER — LIDOCAINE HYDROCHLORIDE 10 MG/ML
5 INJECTION, SOLUTION EPIDURAL; INFILTRATION; INTRACAUDAL; PERINEURAL ONCE
Status: COMPLETED | OUTPATIENT
Start: 2025-08-04 | End: 2025-08-04

## 2025-08-04 RX ADMIN — LIDOCAINE HYDROCHLORIDE 5 ML: 10 INJECTION, SOLUTION EPIDURAL; INFILTRATION; INTRACAUDAL; PERINEURAL at 14:16

## 2025-08-05 ENCOUNTER — TELEPHONE (OUTPATIENT)
Dept: RADIOLOGY | Facility: HOSPITAL | Age: 40
End: 2025-08-05

## 2025-08-06 ENCOUNTER — TELEPHONE (OUTPATIENT)
Dept: RADIOLOGY | Facility: HOSPITAL | Age: 40
End: 2025-08-06

## 2025-08-06 PROCEDURE — 88341 IMHCHEM/IMCYTCHM EA ADD ANTB: CPT | Performed by: PATHOLOGY

## 2025-08-06 PROCEDURE — 88305 TISSUE EXAM BY PATHOLOGIST: CPT | Performed by: PATHOLOGY

## 2025-08-06 PROCEDURE — 88342 IMHCHEM/IMCYTCHM 1ST ANTB: CPT | Performed by: PATHOLOGY

## (undated) DEVICE — CUFF TOURNIQUET 18 X 4 IN QUICK CONNECT DISP 1 BLADDER

## (undated) DEVICE — PACK GENERAL LF

## (undated) DEVICE — ALUMI-HAND POSITIONER XLG

## (undated) DEVICE — COBAN 1 IN UNSTERILE

## (undated) DEVICE — SPONGE GAUZE 4 X 8 12 PLY STRL LF

## (undated) DEVICE — COBAN 4 IN STERILE

## (undated) DEVICE — CURITY NON-ADHERENT STRIPS: Brand: CURITY

## (undated) DEVICE — SYRINGE 10ML LL CONTROL TOP

## (undated) DEVICE — 3M™ STERI-STRIP™ REINFORCED ADHESIVE SKIN CLOSURES, R1547, 1/2 IN X 4 IN (12 MM X 100 MM), 6 STRIPS/ENVELOPE: Brand: 3M™ STERI-STRIP™

## (undated) DEVICE — ACE WRAP 3 IN UNSTERILE

## (undated) DEVICE — EXOFIN PRECISION PEN HIGH VISCOSITY TOPICAL SKIN ADHESIVE: Brand: EXOFIN PRECISION PEN, 1G

## (undated) DEVICE — TOWEL SET X-RAY

## (undated) DEVICE — TIBURON HAND DRAPE: Brand: CONVERTORS

## (undated) DEVICE — ASTOUND STANDARD SURGICAL GOWN, XL: Brand: CONVERTORS

## (undated) DEVICE — 3M™ TEGADERM™ CHG DRESSING 25/CARTON 4 CARTONS/CASE 1659: Brand: TEGADERM™

## (undated) DEVICE — ACE WRAP 6 IN XL STERILE

## (undated) DEVICE — CHLORAPREP HI-LITE 26ML ORANGE

## (undated) DEVICE — ACE WRAP 4 IN UNSTERILE

## (undated) DEVICE — NEEDLE 25GA X 1 IN SAFETY GLIDE

## (undated) DEVICE — ARM SLING: Brand: DEROYAL

## (undated) DEVICE — DRAPE SHEET THREE QUARTER